# Patient Record
Sex: MALE | Race: WHITE | NOT HISPANIC OR LATINO
[De-identification: names, ages, dates, MRNs, and addresses within clinical notes are randomized per-mention and may not be internally consistent; named-entity substitution may affect disease eponyms.]

---

## 2017-01-31 ENCOUNTER — APPOINTMENT (OUTPATIENT)
Dept: CARDIOLOGY | Facility: CLINIC | Age: 57
End: 2017-01-31

## 2017-01-31 VITALS — BODY MASS INDEX: 35 KG/M2 | HEIGHT: 71 IN | WEIGHT: 250 LBS

## 2017-01-31 VITALS — DIASTOLIC BLOOD PRESSURE: 90 MMHG | HEART RATE: 88 BPM | SYSTOLIC BLOOD PRESSURE: 140 MMHG

## 2017-02-22 ENCOUNTER — APPOINTMENT (OUTPATIENT)
Dept: CARDIOLOGY | Facility: CLINIC | Age: 57
End: 2017-02-22

## 2017-02-22 VITALS — SYSTOLIC BLOOD PRESSURE: 128 MMHG | HEART RATE: 74 BPM | DIASTOLIC BLOOD PRESSURE: 78 MMHG

## 2017-02-22 VITALS — BODY MASS INDEX: 34.58 KG/M2 | WEIGHT: 247 LBS | HEIGHT: 71 IN

## 2017-04-18 ENCOUNTER — RX RENEWAL (OUTPATIENT)
Age: 57
End: 2017-04-18

## 2017-06-02 ENCOUNTER — OUTPATIENT (OUTPATIENT)
Dept: OUTPATIENT SERVICES | Facility: HOSPITAL | Age: 57
LOS: 1 days | Discharge: HOME | End: 2017-06-02

## 2017-06-02 DIAGNOSIS — R07.9 CHEST PAIN, UNSPECIFIED: ICD-10-CM

## 2017-06-08 ENCOUNTER — APPOINTMENT (OUTPATIENT)
Dept: CARDIOLOGY | Facility: CLINIC | Age: 57
End: 2017-06-08

## 2017-06-08 VITALS — WEIGHT: 248 LBS | BODY MASS INDEX: 34.72 KG/M2 | HEIGHT: 71 IN

## 2017-06-08 VITALS — DIASTOLIC BLOOD PRESSURE: 80 MMHG | HEART RATE: 72 BPM | SYSTOLIC BLOOD PRESSURE: 122 MMHG

## 2017-06-28 DIAGNOSIS — I10 ESSENTIAL (PRIMARY) HYPERTENSION: ICD-10-CM

## 2017-06-28 DIAGNOSIS — E78.5 HYPERLIPIDEMIA, UNSPECIFIED: ICD-10-CM

## 2017-08-17 ENCOUNTER — MEDICATION RENEWAL (OUTPATIENT)
Age: 57
End: 2017-08-17

## 2017-09-27 ENCOUNTER — TRANSCRIPTION ENCOUNTER (OUTPATIENT)
Age: 57
End: 2017-09-27

## 2017-10-03 ENCOUNTER — MEDICATION RENEWAL (OUTPATIENT)
Age: 57
End: 2017-10-03

## 2017-10-16 ENCOUNTER — OUTPATIENT (OUTPATIENT)
Dept: OUTPATIENT SERVICES | Facility: HOSPITAL | Age: 57
LOS: 1 days | Discharge: HOME | End: 2017-10-16

## 2017-10-16 DIAGNOSIS — E78.5 HYPERLIPIDEMIA, UNSPECIFIED: ICD-10-CM

## 2017-10-16 DIAGNOSIS — I25.10 ATHEROSCLEROTIC HEART DISEASE OF NATIVE CORONARY ARTERY WITHOUT ANGINA PECTORIS: ICD-10-CM

## 2017-10-16 DIAGNOSIS — I10 ESSENTIAL (PRIMARY) HYPERTENSION: ICD-10-CM

## 2017-10-16 DIAGNOSIS — R07.9 CHEST PAIN, UNSPECIFIED: ICD-10-CM

## 2017-10-16 DIAGNOSIS — R73.03 PREDIABETES: ICD-10-CM

## 2017-10-16 DIAGNOSIS — K21.0 GASTRO-ESOPHAGEAL REFLUX DISEASE WITH ESOPHAGITIS: ICD-10-CM

## 2017-10-16 DIAGNOSIS — Z00.00 ENCOUNTER FOR GENERAL ADULT MEDICAL EXAMINATION WITHOUT ABNORMAL FINDINGS: ICD-10-CM

## 2017-10-20 ENCOUNTER — APPOINTMENT (OUTPATIENT)
Dept: CARDIOLOGY | Facility: CLINIC | Age: 57
End: 2017-10-20

## 2017-10-20 VITALS — DIASTOLIC BLOOD PRESSURE: 82 MMHG | HEART RATE: 74 BPM | SYSTOLIC BLOOD PRESSURE: 120 MMHG

## 2017-10-20 VITALS — BODY MASS INDEX: 35.28 KG/M2 | HEIGHT: 71 IN | WEIGHT: 252 LBS

## 2018-02-21 ENCOUNTER — RX RENEWAL (OUTPATIENT)
Age: 58
End: 2018-02-21

## 2018-02-26 ENCOUNTER — LABORATORY RESULT (OUTPATIENT)
Age: 58
End: 2018-02-26

## 2018-02-26 ENCOUNTER — OUTPATIENT (OUTPATIENT)
Dept: OUTPATIENT SERVICES | Facility: HOSPITAL | Age: 58
LOS: 1 days | Discharge: HOME | End: 2018-02-26

## 2018-02-26 DIAGNOSIS — Z00.00 ENCOUNTER FOR GENERAL ADULT MEDICAL EXAMINATION WITHOUT ABNORMAL FINDINGS: ICD-10-CM

## 2018-02-26 DIAGNOSIS — R73.03 PREDIABETES: ICD-10-CM

## 2018-02-26 DIAGNOSIS — E78.5 HYPERLIPIDEMIA, UNSPECIFIED: ICD-10-CM

## 2018-02-26 DIAGNOSIS — I25.10 ATHEROSCLEROTIC HEART DISEASE OF NATIVE CORONARY ARTERY WITHOUT ANGINA PECTORIS: ICD-10-CM

## 2018-02-26 DIAGNOSIS — I10 ESSENTIAL (PRIMARY) HYPERTENSION: ICD-10-CM

## 2018-02-26 DIAGNOSIS — K21.0 GASTRO-ESOPHAGEAL REFLUX DISEASE WITH ESOPHAGITIS: ICD-10-CM

## 2018-03-02 ENCOUNTER — APPOINTMENT (OUTPATIENT)
Dept: CARDIOLOGY | Facility: CLINIC | Age: 58
End: 2018-03-02

## 2018-03-02 VITALS — SYSTOLIC BLOOD PRESSURE: 120 MMHG | DIASTOLIC BLOOD PRESSURE: 70 MMHG

## 2018-03-02 VITALS — WEIGHT: 253 LBS | HEIGHT: 71 IN | BODY MASS INDEX: 35.42 KG/M2

## 2018-03-06 ENCOUNTER — OUTPATIENT (OUTPATIENT)
Dept: OUTPATIENT SERVICES | Facility: HOSPITAL | Age: 58
LOS: 1 days | Discharge: HOME | End: 2018-03-06

## 2018-03-06 DIAGNOSIS — E78.5 HYPERLIPIDEMIA, UNSPECIFIED: ICD-10-CM

## 2018-03-06 DIAGNOSIS — K21.0 GASTRO-ESOPHAGEAL REFLUX DISEASE WITH ESOPHAGITIS: ICD-10-CM

## 2018-03-06 DIAGNOSIS — I25.10 ATHEROSCLEROTIC HEART DISEASE OF NATIVE CORONARY ARTERY WITHOUT ANGINA PECTORIS: ICD-10-CM

## 2018-03-06 DIAGNOSIS — I10 ESSENTIAL (PRIMARY) HYPERTENSION: ICD-10-CM

## 2018-03-06 DIAGNOSIS — I70.90 UNSPECIFIED ATHEROSCLEROSIS: ICD-10-CM

## 2018-03-11 LAB
POTASSIUM SERPL-SCNC: 3.2 MMOL/L
POTASSIUM SERPL-SCNC: 4.9 MMOL/L

## 2018-05-16 ENCOUNTER — MEDICATION RENEWAL (OUTPATIENT)
Age: 58
End: 2018-05-16

## 2018-05-27 ENCOUNTER — RX RENEWAL (OUTPATIENT)
Age: 58
End: 2018-05-27

## 2018-06-05 ENCOUNTER — APPOINTMENT (OUTPATIENT)
Dept: CARDIOLOGY | Facility: CLINIC | Age: 58
End: 2018-06-05

## 2018-06-08 ENCOUNTER — OUTPATIENT (OUTPATIENT)
Dept: OUTPATIENT SERVICES | Facility: HOSPITAL | Age: 58
LOS: 1 days | Discharge: HOME | End: 2018-06-08

## 2018-06-08 DIAGNOSIS — I10 ESSENTIAL (PRIMARY) HYPERTENSION: ICD-10-CM

## 2018-06-08 DIAGNOSIS — K21.0 GASTRO-ESOPHAGEAL REFLUX DISEASE WITH ESOPHAGITIS: ICD-10-CM

## 2018-06-08 DIAGNOSIS — R73.03 PREDIABETES: ICD-10-CM

## 2018-06-08 DIAGNOSIS — I70.90 UNSPECIFIED ATHEROSCLEROSIS: ICD-10-CM

## 2018-06-08 DIAGNOSIS — I25.10 ATHEROSCLEROTIC HEART DISEASE OF NATIVE CORONARY ARTERY WITHOUT ANGINA PECTORIS: ICD-10-CM

## 2018-06-09 LAB
ALBUMIN SERPL ELPH-MCNC: 4.4 G/DL
ALP BLD-CCNC: 61 U/L
ALT SERPL-CCNC: 24 U/L
ANION GAP SERPL CALC-SCNC: 16 MMOL/L
AST SERPL-CCNC: 24 U/L
BASOPHILS # BLD AUTO: 0 K/UL
BASOPHILS NFR BLD AUTO: 0 %
BILIRUB SERPL-MCNC: 1 MG/DL
BUN SERPL-MCNC: 19 MG/DL
CALCIUM SERPL-MCNC: 9.1 MG/DL
CHLORIDE SERPL-SCNC: 106 MMOL/L
CHOLEST SERPL-MCNC: 121 MG/DL
CHOLEST/HDLC SERPL: 3 RATIO
CO2 SERPL-SCNC: 25 MMOL/L
CREAT SERPL-MCNC: 1 MG/DL
EOSINOPHIL # BLD AUTO: 0.11 K/UL
EOSINOPHIL NFR BLD AUTO: 2.6 %
GLUCOSE SERPL-MCNC: 65 MG/DL
HCT VFR BLD CALC: 46.3 %
HDLC SERPL-MCNC: 40 MG/DL
HGB BLD-MCNC: 14.6 G/DL
IMM GRANULOCYTES NFR BLD AUTO: 0 %
LDLC SERPL CALC-MCNC: 70 MG/DL
LYMPHOCYTES # BLD AUTO: 1.77 K/UL
LYMPHOCYTES NFR BLD AUTO: 42.1 %
MAN DIFF?: NORMAL
MCHC RBC-ENTMCNC: 30 PG
MCHC RBC-ENTMCNC: 31.5 GM/DL
MCV RBC AUTO: 95.1 FL
MONOCYTES # BLD AUTO: 0.31 K/UL
MONOCYTES NFR BLD AUTO: 7.4 %
NEUTROPHILS # BLD AUTO: 2.01 K/UL
NEUTROPHILS NFR BLD AUTO: 47.9 %
PLATELET # BLD AUTO: 158 K/UL
POTASSIUM SERPL-SCNC: 4.5 MMOL/L
PROT SERPL-MCNC: 6.6 G/DL
RBC # BLD: 4.87 M/UL
RBC # FLD: 14.2 %
SODIUM SERPL-SCNC: 147 MMOL/L
TRIGL SERPL-MCNC: 97 MG/DL
WBC # FLD AUTO: 4.2 K/UL

## 2018-06-13 ENCOUNTER — APPOINTMENT (OUTPATIENT)
Dept: CARDIOLOGY | Facility: CLINIC | Age: 58
End: 2018-06-13

## 2018-06-13 VITALS
SYSTOLIC BLOOD PRESSURE: 122 MMHG | HEART RATE: 68 BPM | DIASTOLIC BLOOD PRESSURE: 78 MMHG | BODY MASS INDEX: 34.44 KG/M2 | WEIGHT: 246 LBS | HEIGHT: 71 IN

## 2018-08-22 ENCOUNTER — MEDICATION RENEWAL (OUTPATIENT)
Age: 58
End: 2018-08-22

## 2018-08-23 ENCOUNTER — MEDICATION RENEWAL (OUTPATIENT)
Age: 58
End: 2018-08-23

## 2018-09-25 ENCOUNTER — MEDICATION RENEWAL (OUTPATIENT)
Age: 58
End: 2018-09-25

## 2018-11-02 ENCOUNTER — APPOINTMENT (OUTPATIENT)
Dept: CARDIOLOGY | Facility: CLINIC | Age: 58
End: 2018-11-02

## 2018-11-12 ENCOUNTER — OUTPATIENT (OUTPATIENT)
Dept: OUTPATIENT SERVICES | Facility: HOSPITAL | Age: 58
LOS: 1 days | Discharge: HOME | End: 2018-11-12

## 2018-11-12 DIAGNOSIS — K21.0 GASTRO-ESOPHAGEAL REFLUX DISEASE WITH ESOPHAGITIS: ICD-10-CM

## 2018-11-12 DIAGNOSIS — R73.03 PREDIABETES: ICD-10-CM

## 2018-11-12 DIAGNOSIS — E78.5 HYPERLIPIDEMIA, UNSPECIFIED: ICD-10-CM

## 2018-11-12 DIAGNOSIS — I25.10 ATHEROSCLEROTIC HEART DISEASE OF NATIVE CORONARY ARTERY WITHOUT ANGINA PECTORIS: ICD-10-CM

## 2018-11-12 DIAGNOSIS — I10 ESSENTIAL (PRIMARY) HYPERTENSION: ICD-10-CM

## 2018-11-12 DIAGNOSIS — Z00.00 ENCOUNTER FOR GENERAL ADULT MEDICAL EXAMINATION WITHOUT ABNORMAL FINDINGS: ICD-10-CM

## 2018-11-12 DIAGNOSIS — I70.90 UNSPECIFIED ATHEROSCLEROSIS: ICD-10-CM

## 2018-11-17 ENCOUNTER — APPOINTMENT (OUTPATIENT)
Dept: CARDIOLOGY | Facility: CLINIC | Age: 58
End: 2018-11-17

## 2018-11-17 VITALS
HEART RATE: 62 BPM | HEIGHT: 71 IN | DIASTOLIC BLOOD PRESSURE: 70 MMHG | SYSTOLIC BLOOD PRESSURE: 108 MMHG | BODY MASS INDEX: 35.42 KG/M2 | WEIGHT: 253 LBS

## 2018-11-17 NOTE — PHYSICAL EXAM
[General Appearance - Well Developed] : well developed [Normal Appearance] : normal appearance [Well Groomed] : well groomed [General Appearance - Well Nourished] : well nourished [No Deformities] : no deformities [General Appearance - In No Acute Distress] : no acute distress [Normal Conjunctiva] : the conjunctiva exhibited no abnormalities [Eyelids - No Xanthelasma] : the eyelids demonstrated no xanthelasmas [Normal Oral Mucosa] : normal oral mucosa [No Oral Pallor] : no oral pallor [No Oral Cyanosis] : no oral cyanosis [Respiration, Rhythm And Depth] : normal respiratory rhythm and effort [Exaggerated Use Of Accessory Muscles For Inspiration] : no accessory muscle use [Auscultation Breath Sounds / Voice Sounds] : lungs were clear to auscultation bilaterally [Abdomen Soft] : soft [Abdomen Tenderness] : non-tender [Abdomen Mass (___ Cm)] : no abdominal mass palpated [Abnormal Walk] : normal gait [Gait - Sufficient For Exercise Testing] : the gait was sufficient for exercise testing [Nail Clubbing] : no clubbing of the fingernails [Cyanosis, Localized] : no localized cyanosis [Petechial Hemorrhages (___cm)] : no petechial hemorrhages [Skin Color & Pigmentation] : normal skin color and pigmentation [] : no rash [No Venous Stasis] : no venous stasis [Skin Lesions] : no skin lesions [No Skin Ulcers] : no skin ulcer [No Xanthoma] : no  xanthoma was observed [Oriented To Time, Place, And Person] : oriented to person, place, and time [Affect] : the affect was normal [Mood] : the mood was normal [No Anxiety] : not feeling anxious [Normal Rate] : normal [Rhythm Regular] : regular [1+] : left 1+ [FreeTextEntry1] : No JVD  [Rt] : no varicose veins of the right leg [Lt] : no varicose veins of the left leg

## 2018-11-17 NOTE — HISTORY OF PRESENT ILLNESS
[FreeTextEntry1] : The patient has had a cath Stents in his LCX and RCA are patent. Lesion in the mid LAD is not signficant by FFR. The patient did have a severe lesion very distal in the LAD apical segment which was small in calber <2 mm.\par The patient has had atypical symptoms. He had an ETT echo . He had completed 9 minutes and 32 seconds No ischemia or symptoms . He has GERD as well and is under the care of GI . . Sympotms remain unchanged. He had stopped his ranexa on his own . He has no chest pain c/w his previous heart idscomfort.

## 2018-11-18 LAB
ALBUMIN SERPL ELPH-MCNC: 4.3 G/DL
ALP BLD-CCNC: 66 U/L
ALT SERPL-CCNC: 18 U/L
ANION GAP SERPL CALC-SCNC: 13 MMOL/L
AST SERPL-CCNC: 16 U/L
BASOPHILS # BLD AUTO: 0.02 K/UL
BASOPHILS NFR BLD AUTO: 0.3 %
BILIRUB SERPL-MCNC: 0.6 MG/DL
BUN SERPL-MCNC: 18 MG/DL
CALCIUM SERPL-MCNC: 8.7 MG/DL
CHLORIDE SERPL-SCNC: 106 MMOL/L
CHOLEST SERPL-MCNC: 119 MG/DL
CHOLEST/HDLC SERPL: 2.5 RATIO
CO2 SERPL-SCNC: 25 MMOL/L
CREAT SERPL-MCNC: 1 MG/DL
EOSINOPHIL # BLD AUTO: 0.08 K/UL
EOSINOPHIL NFR BLD AUTO: 1.1 %
ESTIMATED AVERAGE GLUCOSE: 108 MG/DL
GLUCOSE SERPL-MCNC: 114 MG/DL
HBA1C MFR BLD HPLC: 5.4 %
HCT VFR BLD CALC: 47.6 %
HDLC SERPL-MCNC: 48 MG/DL
HGB BLD-MCNC: 15.3 G/DL
IMM GRANULOCYTES NFR BLD AUTO: 0.3 %
LDLC SERPL CALC-MCNC: 67 MG/DL
LYMPHOCYTES # BLD AUTO: 1.51 K/UL
LYMPHOCYTES NFR BLD AUTO: 21.5 %
MAN DIFF?: NORMAL
MCHC RBC-ENTMCNC: 30 PG
MCHC RBC-ENTMCNC: 32.1 G/DL
MCV RBC AUTO: 93.3 FL
MONOCYTES # BLD AUTO: 0.42 K/UL
MONOCYTES NFR BLD AUTO: 6 %
NEUTROPHILS # BLD AUTO: 4.98 K/UL
NEUTROPHILS NFR BLD AUTO: 70.8 %
PLATELET # BLD AUTO: 172 K/UL
POTASSIUM SERPL-SCNC: 4.5 MMOL/L
PROT SERPL-MCNC: 6.5 G/DL
RBC # BLD: 5.1 M/UL
RBC # FLD: 13.5 %
SODIUM SERPL-SCNC: 144 MMOL/L
TRIGL SERPL-MCNC: 73 MG/DL
WBC # FLD AUTO: 7.03 K/UL

## 2018-12-18 ENCOUNTER — APPOINTMENT (OUTPATIENT)
Dept: PLASTIC SURGERY | Facility: CLINIC | Age: 58
End: 2018-12-18
Payer: COMMERCIAL

## 2018-12-18 VITALS — BODY MASS INDEX: 33.18 KG/M2 | WEIGHT: 245 LBS | HEIGHT: 72 IN

## 2018-12-18 PROCEDURE — 99203 OFFICE O/P NEW LOW 30 MIN: CPT

## 2019-02-15 ENCOUNTER — OUTPATIENT (OUTPATIENT)
Dept: OUTPATIENT SERVICES | Facility: HOSPITAL | Age: 59
LOS: 1 days | Discharge: HOME | End: 2019-02-15

## 2019-02-15 ENCOUNTER — LABORATORY RESULT (OUTPATIENT)
Age: 59
End: 2019-02-15

## 2019-02-15 ENCOUNTER — APPOINTMENT (OUTPATIENT)
Dept: PLASTIC SURGERY | Facility: CLINIC | Age: 59
End: 2019-02-15
Payer: COMMERCIAL

## 2019-02-15 PROCEDURE — 13132 CMPLX RPR F/C/C/M/N/AX/G/H/F: CPT | Mod: 59

## 2019-02-15 PROCEDURE — 17110 DESTRUCTION B9 LES UP TO 14: CPT | Mod: 59

## 2019-02-15 PROCEDURE — 11423 EXC H-F-NK-SP B9+MARG 2.1-3: CPT | Mod: 59

## 2019-02-15 PROCEDURE — 11422 EXC H-F-NK-SP B9+MARG 1.1-2: CPT

## 2019-02-15 NOTE — PROCEDURE
[FreeTextEntry6] : Patient is a 58 year old male with  2 lesions and multiple skin tags:\par \par 1.  right neck lesion measuring approximately 2 x 1.5 cm \par 2.  anterior neck lesion measuring approximately 1.5 x 1 cm\par 3.  right axilla, left neck, left lateral canthus, left thigh skin tags measuring approximately 5 mm \par \par The areas were prepped and draped in the usual fashion.  Local anesthetic was administered to each site using 1% lidocaine with epinephrine.\par \par Lesions were sharply excised from the right neck, anterior neck and right axilla, left neck, left lateral canthus, left thigh skin tags .  All areas were irrigated copiously.  Complex wound closure was performed in layers for the right and anterior neck. Hemostasis using monsels solution for the skin tags.\par \par 1.  right neck wound measuring approximately 2 cm \par 2.  anterior neck wound measuring approximately 1.5 cm\par 3.   right axilla, left neck, left lateral canthus, left thigh skin tags wound measuring approximately  5 mm \par \par \par Sterile dressing applied to each site.  \par \par Patient tolerated procedure well and understands post-op instructions.\par \par Sutures Used: 4-0 nylon, monsels\par \par \par \par

## 2019-02-18 DIAGNOSIS — D48.5 NEOPLASM OF UNCERTAIN BEHAVIOR OF SKIN: ICD-10-CM

## 2019-02-25 ENCOUNTER — APPOINTMENT (OUTPATIENT)
Dept: PLASTIC SURGERY | Facility: CLINIC | Age: 59
End: 2019-02-25
Payer: COMMERCIAL

## 2019-02-25 PROCEDURE — 99024 POSTOP FOLLOW-UP VISIT: CPT

## 2019-02-25 NOTE — ASSESSMENT
[FreeTextEntry1] : 57 yo M POD#10 s/p excision of 2 right neck lesions and multiple skin tags. Pathology reveals seborrheic keratosis, polypoid intradermal melanocytic nevi and skin tags, all benign. Doing well. \par \par - right neck sutures removed, steri strips applied\par - daily Aquaphor to all sites \par - scar massage \par - may use Scarguard in 1 week\par - pathology discussed\par - f/u PRN

## 2019-02-25 NOTE — PHYSICAL EXAM
[de-identified] : well-appearing male, NAD [de-identified] : right neck incisions clean and dry, no evidence of cellulitis or fluid collection, slight dog ear deformity, left neck shave site c/d/i [de-identified] : right axilla, left neck, right groin and left lateral canthal shave sites healing appropriately, c/d/i, no evidence of cellulitis

## 2019-02-25 NOTE — HISTORY OF PRESENT ILLNESS
[FreeTextEntry1] :  59 y/o M with PMH od CAB s/p PCI x4 on ASA/Plavix who presents for evaluation of right neck growth x10 years, getting progressively larger. C/o "throbbing" pain and skin sloughing. Denies trauma or bleeding/drainage. Denies personal or family hx of Skin CA. 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 Does not see a dermatologist. \par \par Interval history (2/25/19). Patient presents today POD#10 s/p excision of 2 right neck lesions and multiple skin tags. He is doing well and denies any fever, chills or bleeding.

## 2019-02-25 NOTE — DATA REVIEWED
[FreeTextEntry1] :  Biopsy             Final\par \par \par   STEFANIE POTTS                        \par \par                 Surgical Final Report\par \par \par         Final Diagnosis\par           1. Left neck, pigmented lesion, excision:\par            - Seborrheic keratosis.\par \par           2. Anterior neck, pigmented lesion, excision:\par            - Seborrheic keratosis.\par \par           3. Skin tags, left neck, right axilla x 2, right groin and left\par           lateral canthus, excision:\par            - Two polypoid intradermal melanocytic nevi, skin tags, and\par           polypoid seborrheic keratosis.\par \par           Verified by: Marina Avalos M.D.\par           (Electronic Signature)\par           Reported on: 02/21/19 08:36 EST, [facility],\[place]           [place] [address]\par           _________________________________________________________________\par \par           Clinical History\par           Excision of left neck pigmented lesion, excision of anterior neck\par           pigmented lesion, excision of left neck, right axilla x2, right\par           groin, left lateral canthus skin tags\par \par           Specimen(s) Submitted\par           1     Left neck pigmented lesion\par           2     Anterior neck pigmented lesion\par           3     Left neck, Right axilla, Right groin, Left lateral canthus\par           skin tags\par \par           Gross Description\par           1. The specimen is received in formalin, labeled "left neck\par           pigmented lesion" and consists of an unoriented ellipse of tan\par           skin measuring 1.5 x 0.7 x 0.3 cm On the skin surface there is\par           one raised grey lesion measuring 0.9 x 0.7 cm, < 0.1 cm from the\par           nearest margin. The base of the specimen is inked black. The\par           specimen is submitted entirely.\par \par           Summary of Sections:\par \par           1A - Tips, shaved - 1\par           1B - Remaining serially sectioned  specimen - 1\par \par           Total blocks: 2\par \par           2. The specimen is received in formalin, and labeled "anterior\par           neck pigmented lesion" and consists of an unoriented ellipse of\par           tan skin measuring 0.7 x 0.3 x 0.3 cm. On the skin surface, there\par           is a raised grey lesion measuring 0.4 x 0.3\par \par \par \par \par \par           STEFANIE POTTS                        2\par \par \par \par                  Surgical Final Report\par \par \par \par \par           cm. It is < 0.1 cm from the nearest margin. The base of the\par           specimen is inked black, the specimen is trisected. The specimen\par           is submitted entirely. (1 block)\par \par           3. The specimen is received in formalin, and labeled "left\par           lateral canthus skin tag" and consists of six polypoid fragments\par           of tan skin, measuring 0.2 x 0.1 x 0.1 cm through 1 x 0.7 x 0.5\par           cm. The bases of the two largest fragments are inked black, both\par           are bisected. The specimen is submitted entirely. (2 blocks)\par \par           Specimen was received and underwent gross examination at [facility], [address] [place] [address],\[place] [address].\par \par           02/18/19 13:54 ns\par \par           Perioperative Diagnosis\par           D48.5 - Neoplasm of uncertain behavior of skin\par \par  \par \par  Ordered by: ROSEY CHINO IV       Collected/Examined: [id number] 12:36PM       \par Verification Required       Stage: Final       \par  Performed at: [facility] (Med Director: Wilner Clay M.D)       Resulted: [id number] 08:36AM       Last Updated: 21Feb2019 08:36AM       Accession: [id number]

## 2019-02-28 ENCOUNTER — MEDICATION RENEWAL (OUTPATIENT)
Age: 59
End: 2019-02-28

## 2019-03-03 ENCOUNTER — RX RENEWAL (OUTPATIENT)
Age: 59
End: 2019-03-03

## 2019-03-18 ENCOUNTER — LABORATORY RESULT (OUTPATIENT)
Age: 59
End: 2019-03-18

## 2019-03-18 ENCOUNTER — OUTPATIENT (OUTPATIENT)
Dept: OUTPATIENT SERVICES | Facility: HOSPITAL | Age: 59
LOS: 1 days | Discharge: HOME | End: 2019-03-18

## 2019-03-18 DIAGNOSIS — E78.5 HYPERLIPIDEMIA, UNSPECIFIED: ICD-10-CM

## 2019-03-18 DIAGNOSIS — I25.10 ATHEROSCLEROTIC HEART DISEASE OF NATIVE CORONARY ARTERY WITHOUT ANGINA PECTORIS: ICD-10-CM

## 2019-03-18 DIAGNOSIS — I10 ESSENTIAL (PRIMARY) HYPERTENSION: ICD-10-CM

## 2019-03-18 DIAGNOSIS — R73.03 PREDIABETES: ICD-10-CM

## 2019-03-18 DIAGNOSIS — K21.0 GASTRO-ESOPHAGEAL REFLUX DISEASE WITH ESOPHAGITIS: ICD-10-CM

## 2019-03-24 LAB
ALBUMIN SERPL ELPH-MCNC: 4.2 G/DL
ALP BLD-CCNC: 53 U/L
ALT SERPL-CCNC: 29 U/L
ANION GAP SERPL CALC-SCNC: 7 MMOL/L
AST SERPL-CCNC: 27 U/L
BILIRUB SERPL-MCNC: 0.8 MG/DL
BUN SERPL-MCNC: 16 MG/DL
CALCIUM SERPL-MCNC: 8.7 MG/DL
CHLORIDE SERPL-SCNC: 106 MMOL/L
CHOLEST SERPL-MCNC: 123 MG/DL
CHOLEST/HDLC SERPL: 3 RATIO
CO2 SERPL-SCNC: 30 MMOL/L
CREAT SERPL-MCNC: 1.2 MG/DL
ESTIMATED AVERAGE GLUCOSE: 97 MG/DL
GLUCOSE SERPL-MCNC: 101 MG/DL
HBA1C MFR BLD HPLC: 5 %
HDLC SERPL-MCNC: 41 MG/DL
LDLC SERPL CALC-MCNC: 75 MG/DL
POTASSIUM SERPL-SCNC: 4.9 MMOL/L
PROT SERPL-MCNC: 6.2 G/DL
SODIUM SERPL-SCNC: 143 MMOL/L
TRIGL SERPL-MCNC: 64 MG/DL

## 2019-03-25 ENCOUNTER — APPOINTMENT (OUTPATIENT)
Dept: CARDIOLOGY | Facility: CLINIC | Age: 59
End: 2019-03-25

## 2019-03-25 VITALS
WEIGHT: 260 LBS | BODY MASS INDEX: 35.21 KG/M2 | HEART RATE: 69 BPM | HEIGHT: 72 IN | SYSTOLIC BLOOD PRESSURE: 160 MMHG | DIASTOLIC BLOOD PRESSURE: 90 MMHG

## 2019-03-25 NOTE — HISTORY OF PRESENT ILLNESS
[FreeTextEntry1] : The patient has been unchanged. Symptoms seems to be unchanged. A lot of his sympotms come on with stress and aggravation. Hx of PCI and stents i LCX and RCA . LAD has distal disease . He has no symptoms similar to his pre stent symptoms. He has gained weight . ETT echo in 6-18 was negative for ischemia  .

## 2019-03-25 NOTE — ASSESSMENT
[FreeTextEntry1] : The patient has distal LAD disease which is significant but the segment is too small for intervention. RCA and LCX stents are patent. Mid LAD lesion is not significant by FFR . Has chest pain only when upset. No ECG changes. The patient has had aggressive CAD  and has an area of the distal LAD which is not amenable to intervention . ETT Echo was negative for ischemia at high exercise load in 6-18 . No chest with over 9 minutes of exercise. His symptoms are not similar to the symptoms he had prior to the interventions. He is back on Ranexa There was no change in symptoms. He does not have symptoms simlar to his MI sysmptoms or symptoms prior to intervetnion . He has gained a significant amount of weigh t. He has not been waling .

## 2019-05-22 ENCOUNTER — MEDICATION RENEWAL (OUTPATIENT)
Age: 59
End: 2019-05-22

## 2019-05-23 ENCOUNTER — RX RENEWAL (OUTPATIENT)
Age: 59
End: 2019-05-23

## 2019-06-27 ENCOUNTER — RX RENEWAL (OUTPATIENT)
Age: 59
End: 2019-06-27

## 2019-06-30 ENCOUNTER — RX RENEWAL (OUTPATIENT)
Age: 59
End: 2019-06-30

## 2019-07-12 ENCOUNTER — TRANSCRIPTION ENCOUNTER (OUTPATIENT)
Age: 59
End: 2019-07-12

## 2019-07-12 ENCOUNTER — OTHER (OUTPATIENT)
Age: 59
End: 2019-07-12

## 2019-07-26 ENCOUNTER — OUTPATIENT (OUTPATIENT)
Dept: OUTPATIENT SERVICES | Facility: HOSPITAL | Age: 59
LOS: 1 days | Discharge: HOME | End: 2019-07-26

## 2019-07-26 DIAGNOSIS — E78.5 HYPERLIPIDEMIA, UNSPECIFIED: ICD-10-CM

## 2019-07-26 DIAGNOSIS — I25.10 ATHEROSCLEROTIC HEART DISEASE OF NATIVE CORONARY ARTERY WITHOUT ANGINA PECTORIS: ICD-10-CM

## 2019-07-26 DIAGNOSIS — I10 ESSENTIAL (PRIMARY) HYPERTENSION: ICD-10-CM

## 2019-07-26 DIAGNOSIS — K21.0 GASTRO-ESOPHAGEAL REFLUX DISEASE WITH ESOPHAGITIS: ICD-10-CM

## 2019-07-26 DIAGNOSIS — R73.03 PREDIABETES: ICD-10-CM

## 2019-08-02 ENCOUNTER — APPOINTMENT (OUTPATIENT)
Dept: CARDIOLOGY | Facility: CLINIC | Age: 59
End: 2019-08-02
Payer: COMMERCIAL

## 2019-08-02 VITALS
HEIGHT: 72 IN | BODY MASS INDEX: 34.54 KG/M2 | SYSTOLIC BLOOD PRESSURE: 130 MMHG | HEART RATE: 67 BPM | WEIGHT: 255 LBS | DIASTOLIC BLOOD PRESSURE: 86 MMHG

## 2019-08-02 DIAGNOSIS — I70.90 UNSPECIFIED ATHEROSCLEROSIS: ICD-10-CM

## 2019-08-02 PROCEDURE — 93000 ELECTROCARDIOGRAM COMPLETE: CPT

## 2019-08-02 PROCEDURE — 99214 OFFICE O/P EST MOD 30 MIN: CPT

## 2019-08-02 NOTE — DATA REVIEWED
[FreeTextEntry1] :  Biopsy             Final\par \par \par   STEFANIE POTTS                        \par \par                 Surgical Final Report\par \par \par         Final Diagnosis\par           1. Left neck, pigmented lesion, excision:\par            - Seborrheic keratosis.\par \par           2. Anterior neck, pigmented lesion, excision:\par            - Seborrheic keratosis.\par \par           3. Skin tags, left neck, right axilla x 2, right groin and left\par           lateral canthus, excision:\par            - Two polypoid intradermal melanocytic nevi, skin tags, and\par           polypoid seborrheic keratosis.\par \par           Verified by: Marina Avalos M.D.\par           (Electronic Signature)\par           Reported on: 02/21/19 08:36 EST, [facility],\[place]           [place] [address]\par           _________________________________________________________________\par \par           Clinical History\par           Excision of left neck pigmented lesion, excision of anterior neck\par           pigmented lesion, excision of left neck, right axilla x2, right\par           groin, left lateral canthus skin tags\par \par           Specimen(s) Submitted\par           1     Left neck pigmented lesion\par           2     Anterior neck pigmented lesion\par           3     Left neck, Right axilla, Right groin, Left lateral canthus\par           skin tags\par \par           Gross Description\par           1. The specimen is received in formalin, labeled "left neck\par           pigmented lesion" and consists of an unoriented ellipse of tan\par           skin measuring 1.5 x 0.7 x 0.3 cm On the skin surface there is\par           one raised grey lesion measuring 0.9 x 0.7 cm, < 0.1 cm from the\par           nearest margin. The base of the specimen is inked black. The\par           specimen is submitted entirely.\par \par           Summary of Sections:\par \par           1A - Tips, shaved - 1\par           1B - Remaining serially sectioned  specimen - 1\par \par           Total blocks: 2\par \par           2. The specimen is received in formalin, and labeled "anterior\par           neck pigmented lesion" and consists of an unoriented ellipse of\par           tan skin measuring 0.7 x 0.3 x 0.3 cm. On the skin surface, there\par           is a raised grey lesion measuring 0.4 x 0.3\par \par \par \par \par \par           STEFANIE POTTS                        2\par \par \par \par                  Surgical Final Report\par \par \par \par \par           cm. It is < 0.1 cm from the nearest margin. The base of the\par           specimen is inked black, the specimen is trisected. The specimen\par           is submitted entirely. (1 block)\par \par           3. The specimen is received in formalin, and labeled "left\par           lateral canthus skin tag" and consists of six polypoid fragments\par           of tan skin, measuring 0.2 x 0.1 x 0.1 cm through 1 x 0.7 x 0.5\par           cm. The bases of the two largest fragments are inked black, both\par           are bisected. The specimen is submitted entirely. (2 blocks)\par \par           Specimen was received and underwent gross examination at [facility], [address] [place] [address],\[place] [address].\par \par           02/18/19 13:54 ns\par \par           Perioperative Diagnosis\par           D48.5 - Neoplasm of uncertain behavior of skin\par \par  \par \par  Ordered by: ROSEY CHINO IV       Collected/Examined: [id number] 12:36PM       \par Verification Required       Stage: Final       \par  Performed at: [facility] (Med Director: Wilner Clay M.D)       Resulted: [id number] 08:36AM       Last Updated: 21Feb2019 08:36AM       Accession: [id number]

## 2019-08-02 NOTE — REASON FOR VISIT
[Follow-Up - Clinic] : a clinic follow-up of [Coronary Artery Disease] : coronary artery disease [Hyperlipidemia] : hyperlipidemia [Hypertension] : hypertension [Post Op: _________] : a [unfilled] post op visit

## 2019-08-02 NOTE — PHYSICAL EXAM
[General Appearance - Well Developed] : well developed [Normal Appearance] : normal appearance [Well Groomed] : well groomed [General Appearance - Well Nourished] : well nourished [No Deformities] : no deformities [General Appearance - In No Acute Distress] : no acute distress [Normal Conjunctiva] : the conjunctiva exhibited no abnormalities [Eyelids - No Xanthelasma] : the eyelids demonstrated no xanthelasmas [Normal Oral Mucosa] : normal oral mucosa [No Oral Pallor] : no oral pallor [No Oral Cyanosis] : no oral cyanosis [FreeTextEntry1] : No JVD  [Respiration, Rhythm And Depth] : normal respiratory rhythm and effort [Auscultation Breath Sounds / Voice Sounds] : lungs were clear to auscultation bilaterally [Exaggerated Use Of Accessory Muscles For Inspiration] : no accessory muscle use [Abdomen Soft] : soft [Abdomen Tenderness] : non-tender [Abnormal Walk] : normal gait [Abdomen Mass (___ Cm)] : no abdominal mass palpated [Gait - Sufficient For Exercise Testing] : the gait was sufficient for exercise testing [Nail Clubbing] : no clubbing of the fingernails [Petechial Hemorrhages (___cm)] : no petechial hemorrhages [Cyanosis, Localized] : no localized cyanosis [Skin Color & Pigmentation] : normal skin color and pigmentation [] : no rash [No Venous Stasis] : no venous stasis [Skin Lesions] : no skin lesions [No Skin Ulcers] : no skin ulcer [No Xanthoma] : no  xanthoma was observed [Oriented To Time, Place, And Person] : oriented to person, place, and time [Mood] : the mood was normal [Affect] : the affect was normal [No Anxiety] : not feeling anxious [de-identified] : well-appearing male, NAD [Normal Rate] : normal [de-identified] : right axilla, left neck, right groin and left lateral canthal shave sites healing appropriately, c/d/i, no evidence of cellulitis  [de-identified] : right neck incisions clean and dry, no evidence of cellulitis or fluid collection, slight dog ear deformity, left neck shave site c/d/i [Rhythm Regular] : regular [Rt] : no varicose veins of the right leg [1+] : left 1+ [Lt] : no varicose veins of the left leg

## 2019-08-02 NOTE — HISTORY OF PRESENT ILLNESS
[FreeTextEntry1] : The patient has been feeling well. His GERD symptoms have improved . No chest pain similar to his cardiac issues . The patient has had prior stents in his LCX and RCA . He has distal LAD disease . .

## 2019-08-02 NOTE — ASSESSMENT
[FreeTextEntry1] : The patient has distal LAD disease which is significant but the segment is too small for intervention. RCA and LCX stents are patent. Mid LAD lesion is not significant by FFR . Has chest pain only when upset. No ECG changes. The patient has had aggressive CAD  and has an area of the distal LAD which is not amenable to intervention . ETT Echo was negative for ischemia at high exercise load in 6-18 . No chest with over 9 minutes of exercise. His symptoms are not similar to the symptoms he had prior to the interventions. LDL is at goal He has lost some weight . He has had a torn mensicus in his right knee and he has been exercising on a stationary bike .

## 2019-08-04 LAB
ALBUMIN SERPL ELPH-MCNC: 4.5 G/DL
ALP BLD-CCNC: 53 U/L
ALT SERPL-CCNC: 20 U/L
ANION GAP SERPL CALC-SCNC: 13 MMOL/L
AST SERPL-CCNC: 21 U/L
BASOPHILS # BLD AUTO: 0.01 K/UL
BASOPHILS NFR BLD AUTO: 0.2 %
BILIRUB SERPL-MCNC: 0.8 MG/DL
BUN SERPL-MCNC: 21 MG/DL
CALCIUM SERPL-MCNC: 8.7 MG/DL
CHLORIDE SERPL-SCNC: 105 MMOL/L
CHOLEST SERPL-MCNC: 115 MG/DL
CHOLEST/HDLC SERPL: 3 RATIO
CO2 SERPL-SCNC: 26 MMOL/L
CREAT SERPL-MCNC: 1.2 MG/DL
EOSINOPHIL # BLD AUTO: 0.09 K/UL
EOSINOPHIL NFR BLD AUTO: 1.8 %
ESTIMATED AVERAGE GLUCOSE: 103 MG/DL
GLUCOSE SERPL-MCNC: 92 MG/DL
HBA1C MFR BLD HPLC: 5.2 %
HCT VFR BLD CALC: 44.5 %
HDLC SERPL-MCNC: 38 MG/DL
HGB BLD-MCNC: 14.3 G/DL
IMM GRANULOCYTES NFR BLD AUTO: 0.2 %
LDLC SERPL CALC-MCNC: 64 MG/DL
LYMPHOCYTES # BLD AUTO: 1.69 K/UL
LYMPHOCYTES NFR BLD AUTO: 33.8 %
MAN DIFF?: NORMAL
MCHC RBC-ENTMCNC: 30 PG
MCHC RBC-ENTMCNC: 32.1 G/DL
MCV RBC AUTO: 93.5 FL
MONOCYTES # BLD AUTO: 0.43 K/UL
MONOCYTES NFR BLD AUTO: 8.6 %
NEUTROPHILS # BLD AUTO: 2.77 K/UL
NEUTROPHILS NFR BLD AUTO: 55.4 %
PLATELET # BLD AUTO: 133 K/UL
POTASSIUM SERPL-SCNC: 3.9 MMOL/L
PROT SERPL-MCNC: 6.4 G/DL
RBC # BLD: 4.76 M/UL
RBC # FLD: 14 %
SODIUM SERPL-SCNC: 144 MMOL/L
TRIGL SERPL-MCNC: 70 MG/DL
WBC # FLD AUTO: 5 K/UL

## 2019-08-15 ENCOUNTER — CHART COPY (OUTPATIENT)
Age: 59
End: 2019-08-15

## 2019-09-23 ENCOUNTER — RX RENEWAL (OUTPATIENT)
Age: 59
End: 2019-09-23

## 2019-11-14 ENCOUNTER — OUTPATIENT (OUTPATIENT)
Dept: OUTPATIENT SERVICES | Facility: HOSPITAL | Age: 59
LOS: 1 days | Discharge: HOME | End: 2019-11-14

## 2019-11-14 ENCOUNTER — RX RENEWAL (OUTPATIENT)
Age: 59
End: 2019-11-14

## 2019-11-14 DIAGNOSIS — R35.1 NOCTURIA: ICD-10-CM

## 2019-11-14 DIAGNOSIS — Z80.42 FAMILY HISTORY OF MALIGNANT NEOPLASM OF PROSTATE: ICD-10-CM

## 2019-11-17 ENCOUNTER — RX RENEWAL (OUTPATIENT)
Age: 59
End: 2019-11-17

## 2019-12-05 ENCOUNTER — OUTPATIENT (OUTPATIENT)
Dept: OUTPATIENT SERVICES | Facility: HOSPITAL | Age: 59
LOS: 1 days | Discharge: HOME | End: 2019-12-05

## 2019-12-05 DIAGNOSIS — I10 ESSENTIAL (PRIMARY) HYPERTENSION: ICD-10-CM

## 2019-12-05 DIAGNOSIS — I25.10 ATHEROSCLEROTIC HEART DISEASE OF NATIVE CORONARY ARTERY WITHOUT ANGINA PECTORIS: ICD-10-CM

## 2019-12-05 DIAGNOSIS — R73.03 PREDIABETES: ICD-10-CM

## 2019-12-05 DIAGNOSIS — E78.5 HYPERLIPIDEMIA, UNSPECIFIED: ICD-10-CM

## 2019-12-05 DIAGNOSIS — K21.0 GASTRO-ESOPHAGEAL REFLUX DISEASE WITH ESOPHAGITIS: ICD-10-CM

## 2019-12-08 LAB
ALBUMIN SERPL ELPH-MCNC: 4.5 G/DL
ALP BLD-CCNC: 51 U/L
ALT SERPL-CCNC: 22 U/L
ANION GAP SERPL CALC-SCNC: 10 MMOL/L
AST SERPL-CCNC: 21 U/L
BASOPHILS # BLD AUTO: 0.02 K/UL
BASOPHILS NFR BLD AUTO: 0.4 %
BILIRUB SERPL-MCNC: 0.9 MG/DL
BUN SERPL-MCNC: 20 MG/DL
CALCIUM SERPL-MCNC: 9.3 MG/DL
CHLORIDE SERPL-SCNC: 106 MMOL/L
CHOLEST SERPL-MCNC: 134 MG/DL
CHOLEST/HDLC SERPL: 3.2 RATIO
CO2 SERPL-SCNC: 29 MMOL/L
CREAT SERPL-MCNC: 1.2 MG/DL
EOSINOPHIL # BLD AUTO: 0.17 K/UL
EOSINOPHIL NFR BLD AUTO: 3.5 %
GLUCOSE SERPL-MCNC: 103 MG/DL
HCT VFR BLD CALC: 46.1 %
HDLC SERPL-MCNC: 42 MG/DL
HGB BLD-MCNC: 14.9 G/DL
IMM GRANULOCYTES NFR BLD AUTO: 0.2 %
LDLC SERPL CALC-MCNC: 81 MG/DL
LYMPHOCYTES # BLD AUTO: 1.74 K/UL
LYMPHOCYTES NFR BLD AUTO: 35.4 %
MAN DIFF?: NORMAL
MCHC RBC-ENTMCNC: 30 PG
MCHC RBC-ENTMCNC: 32.3 G/DL
MCV RBC AUTO: 92.8 FL
MONOCYTES # BLD AUTO: 0.46 K/UL
MONOCYTES NFR BLD AUTO: 9.4 %
NEUTROPHILS # BLD AUTO: 2.51 K/UL
NEUTROPHILS NFR BLD AUTO: 51.1 %
PLATELET # BLD AUTO: 157 K/UL
POTASSIUM SERPL-SCNC: 4.3 MMOL/L
PROT SERPL-MCNC: 6.5 G/DL
RBC # BLD: 4.97 M/UL
RBC # FLD: 13.7 %
SODIUM SERPL-SCNC: 145 MMOL/L
TRIGL SERPL-MCNC: 105 MG/DL
WBC # FLD AUTO: 4.91 K/UL

## 2019-12-13 ENCOUNTER — APPOINTMENT (OUTPATIENT)
Dept: CARDIOLOGY | Facility: CLINIC | Age: 59
End: 2019-12-13
Payer: COMMERCIAL

## 2019-12-13 VITALS — SYSTOLIC BLOOD PRESSURE: 130 MMHG | DIASTOLIC BLOOD PRESSURE: 80 MMHG

## 2019-12-13 VITALS
BODY MASS INDEX: 34.81 KG/M2 | WEIGHT: 257 LBS | DIASTOLIC BLOOD PRESSURE: 90 MMHG | HEART RATE: 68 BPM | SYSTOLIC BLOOD PRESSURE: 140 MMHG | HEIGHT: 72 IN

## 2019-12-13 DIAGNOSIS — D48.5 NEOPLASM OF UNCERTAIN BEHAVIOR OF SKIN: ICD-10-CM

## 2019-12-13 PROCEDURE — 93000 ELECTROCARDIOGRAM COMPLETE: CPT

## 2019-12-13 PROCEDURE — 99214 OFFICE O/P EST MOD 30 MIN: CPT

## 2019-12-13 NOTE — ASSESSMENT
[FreeTextEntry1] : The patient has distal LAD disease which is significant but the segment is too small for intervention. RCA and LCX stents are patent. Mid LAD lesion is not significant by FFR . Has chest pain only when upset. No ECG changes. The patient has had aggressive CAD  and has an area of the distal LAD which is not amenable to intervention . ETT Echo was negative for ischemia at high exercise load in 6-18 . No chest with over 9 minutes of exercise. His symptoms are not similar to the symptoms he had prior to the interventions. LDL is at goal He has lost some weight . He has had a torn mensicus in his right knee and he has been exercising on a stationary bike . \par The pateint has remained unchanged since last visit.

## 2019-12-13 NOTE — DATA REVIEWED
[FreeTextEntry1] :  Biopsy             Final\par \par \par   STEFANIE POTTS                        \par \par                 Surgical Final Report\par \par \par         Final Diagnosis\par           1. Left neck, pigmented lesion, excision:\par            - Seborrheic keratosis.\par \par           2. Anterior neck, pigmented lesion, excision:\par            - Seborrheic keratosis.\par \par           3. Skin tags, left neck, right axilla x 2, right groin and left\par           lateral canthus, excision:\par            - Two polypoid intradermal melanocytic nevi, skin tags, and\par           polypoid seborrheic keratosis.\par \par           Verified by: Marina Avalos M.D.\par           (Electronic Signature)\par           Reported on: 02/21/19 08:36 EST, [facility],\[place]           [place] [address]\par           _________________________________________________________________\par \par           Clinical History\par           Excision of left neck pigmented lesion, excision of anterior neck\par           pigmented lesion, excision of left neck, right axilla x2, right\par           groin, left lateral canthus skin tags\par \par           Specimen(s) Submitted\par           1     Left neck pigmented lesion\par           2     Anterior neck pigmented lesion\par           3     Left neck, Right axilla, Right groin, Left lateral canthus\par           skin tags\par \par           Gross Description\par           1. The specimen is received in formalin, labeled "left neck\par           pigmented lesion" and consists of an unoriented ellipse of tan\par           skin measuring 1.5 x 0.7 x 0.3 cm On the skin surface there is\par           one raised grey lesion measuring 0.9 x 0.7 cm, < 0.1 cm from the\par           nearest margin. The base of the specimen is inked black. The\par           specimen is submitted entirely.\par \par           Summary of Sections:\par \par           1A - Tips, shaved - 1\par           1B - Remaining serially sectioned  specimen - 1\par \par           Total blocks: 2\par \par           2. The specimen is received in formalin, and labeled "anterior\par           neck pigmented lesion" and consists of an unoriented ellipse of\par           tan skin measuring 0.7 x 0.3 x 0.3 cm. On the skin surface, there\par           is a raised grey lesion measuring 0.4 x 0.3\par \par \par \par \par \par           STEFANIE POTTS                        2\par \par \par \par                  Surgical Final Report\par \par \par \par \par           cm. It is < 0.1 cm from the nearest margin. The base of the\par           specimen is inked black, the specimen is trisected. The specimen\par           is submitted entirely. (1 block)\par \par           3. The specimen is received in formalin, and labeled "left\par           lateral canthus skin tag" and consists of six polypoid fragments\par           of tan skin, measuring 0.2 x 0.1 x 0.1 cm through 1 x 0.7 x 0.5\par           cm. The bases of the two largest fragments are inked black, both\par           are bisected. The specimen is submitted entirely. (2 blocks)\par \par           Specimen was received and underwent gross examination at [facility], [address] [place] [address],\[place] [address].\par \par           02/18/19 13:54 ns\par \par           Perioperative Diagnosis\par           D48.5 - Neoplasm of uncertain behavior of skin\par \par  \par \par  Ordered by: ROSEY CHINO IV       Collected/Examined: [id number] 12:36PM       \par Verification Required       Stage: Final       \par  Performed at: [facility] (Med Director: Wilner Clay M.D)       Resulted: [id number] 08:36AM       Last Updated: 21Feb2019 08:36AM       Accession: [id number]

## 2019-12-13 NOTE — HISTORY OF PRESENT ILLNESS
[FreeTextEntry1] : The patient has been feeling well. His GERD symptoms have improved . No chest pain similar to his cardiac issues . The patient has had prior stents in his LCX and RCA . He has distal LAD disease . . His symptoms have not changed .

## 2019-12-13 NOTE — PHYSICAL EXAM
[General Appearance - Well Developed] : well developed [Normal Appearance] : normal appearance [Well Groomed] : well groomed [General Appearance - Well Nourished] : well nourished [No Deformities] : no deformities [General Appearance - In No Acute Distress] : no acute distress [Normal Conjunctiva] : the conjunctiva exhibited no abnormalities [Eyelids - No Xanthelasma] : the eyelids demonstrated no xanthelasmas [Normal Oral Mucosa] : normal oral mucosa [No Oral Pallor] : no oral pallor [No Oral Cyanosis] : no oral cyanosis [Exaggerated Use Of Accessory Muscles For Inspiration] : no accessory muscle use [Respiration, Rhythm And Depth] : normal respiratory rhythm and effort [Auscultation Breath Sounds / Voice Sounds] : lungs were clear to auscultation bilaterally [Abdomen Soft] : soft [Abdomen Tenderness] : non-tender [Abdomen Mass (___ Cm)] : no abdominal mass palpated [Gait - Sufficient For Exercise Testing] : the gait was sufficient for exercise testing [Abnormal Walk] : normal gait [Cyanosis, Localized] : no localized cyanosis [Nail Clubbing] : no clubbing of the fingernails [Petechial Hemorrhages (___cm)] : no petechial hemorrhages [Skin Color & Pigmentation] : normal skin color and pigmentation [] : no rash [No Venous Stasis] : no venous stasis [Skin Lesions] : no skin lesions [No Skin Ulcers] : no skin ulcer [No Xanthoma] : no  xanthoma was observed [Oriented To Time, Place, And Person] : oriented to person, place, and time [Affect] : the affect was normal [Mood] : the mood was normal [No Anxiety] : not feeling anxious [Normal Rate] : normal [Rhythm Regular] : regular [1+] : left 1+ [FreeTextEntry1] : No JVD  [de-identified] : well-appearing male, NAD [de-identified] : right neck incisions clean and dry, no evidence of cellulitis or fluid collection, slight dog ear deformity, left neck shave site c/d/i [de-identified] : right axilla, left neck, right groin and left lateral canthal shave sites healing appropriately, c/d/i, no evidence of cellulitis  [Rt] : no varicose veins of the right leg [Lt] : no varicose veins of the left leg

## 2020-04-21 LAB
ALBUMIN SERPL ELPH-MCNC: 4.2 G/DL
ALP BLD-CCNC: 51 U/L
ALT SERPL-CCNC: 24 U/L
ANION GAP SERPL CALC-SCNC: 10 MMOL/L
AST SERPL-CCNC: 25 U/L
BASOPHILS # BLD AUTO: 0.01 K/UL
BASOPHILS NFR BLD AUTO: 0.2 %
BILIRUB SERPL-MCNC: 0.6 MG/DL
BUN SERPL-MCNC: 25 MG/DL
CALCIUM SERPL-MCNC: 9 MG/DL
CHLORIDE SERPL-SCNC: 104 MMOL/L
CHOLEST SERPL-MCNC: 122 MG/DL
CHOLEST/HDLC SERPL: 3.4 RATIO
CO2 SERPL-SCNC: 28 MMOL/L
CREAT SERPL-MCNC: 1.2 MG/DL
EOSINOPHIL # BLD AUTO: 0.11 K/UL
EOSINOPHIL NFR BLD AUTO: 2.5 %
ESTIMATED AVERAGE GLUCOSE: 108 MG/DL
GLUCOSE SERPL-MCNC: 88 MG/DL
HBA1C MFR BLD HPLC: 5.4 %
HCT VFR BLD CALC: 46.1 %
HDLC SERPL-MCNC: 36 MG/DL
HGB BLD-MCNC: 14.3 G/DL
IMM GRANULOCYTES NFR BLD AUTO: 0.2 %
LDLC SERPL CALC-MCNC: 73 MG/DL
LYMPHOCYTES # BLD AUTO: 1.75 K/UL
LYMPHOCYTES NFR BLD AUTO: 39.1 %
MAN DIFF?: NORMAL
MCHC RBC-ENTMCNC: 29.4 PG
MCHC RBC-ENTMCNC: 31 G/DL
MCV RBC AUTO: 94.9 FL
MONOCYTES # BLD AUTO: 0.44 K/UL
MONOCYTES NFR BLD AUTO: 9.8 %
NEUTROPHILS # BLD AUTO: 2.16 K/UL
NEUTROPHILS NFR BLD AUTO: 48.2 %
PLATELET # BLD AUTO: 161 K/UL
POTASSIUM SERPL-SCNC: 4.6 MMOL/L
PROT SERPL-MCNC: 6.3 G/DL
RBC # BLD: 4.86 M/UL
RBC # FLD: 14 %
SODIUM SERPL-SCNC: 142 MMOL/L
TRIGL SERPL-MCNC: 129 MG/DL
WBC # FLD AUTO: 4.48 K/UL

## 2020-04-27 ENCOUNTER — APPOINTMENT (OUTPATIENT)
Dept: CARDIOLOGY | Facility: CLINIC | Age: 60
End: 2020-04-27
Payer: COMMERCIAL

## 2020-04-27 VITALS — BODY MASS INDEX: 35 KG/M2 | HEIGHT: 71 IN | WEIGHT: 250 LBS

## 2020-04-27 PROCEDURE — 99214 OFFICE O/P EST MOD 30 MIN: CPT | Mod: 95

## 2020-04-27 NOTE — PHYSICAL EXAM
[General Appearance - Well Developed] : well developed [Normal Appearance] : normal appearance [No Deformities] : no deformities [Well Groomed] : well groomed [General Appearance - Well Nourished] : well nourished [Normal Conjunctiva] : the conjunctiva exhibited no abnormalities [General Appearance - In No Acute Distress] : no acute distress [Eyelids - No Xanthelasma] : the eyelids demonstrated no xanthelasmas [No Oral Pallor] : no oral pallor [Normal Oral Mucosa] : normal oral mucosa [Respiration, Rhythm And Depth] : normal respiratory rhythm and effort [No Oral Cyanosis] : no oral cyanosis [Exaggerated Use Of Accessory Muscles For Inspiration] : no accessory muscle use [Abdomen Mass (___ Cm)] : no abdominal mass palpated [Abnormal Walk] : normal gait [Cyanosis, Localized] : no localized cyanosis [Nail Clubbing] : no clubbing of the fingernails [Petechial Hemorrhages (___cm)] : no petechial hemorrhages [Skin Color & Pigmentation] : normal skin color and pigmentation [No Venous Stasis] : no venous stasis [] : no ischemic changes [No Xanthoma] : no  xanthoma was observed [No Skin Ulcers] : no skin ulcer [Skin Lesions] : no skin lesions [Affect] : the affect was normal [Mood] : the mood was normal [Oriented To Time, Place, And Person] : oriented to person, place, and time [No Anxiety] : not feeling anxious [FreeTextEntry1] : No JVD  [Rt] : no varicose veins of the right leg [Lt] : no varicose veins of the left leg

## 2020-04-27 NOTE — ASSESSMENT
[FreeTextEntry1] : The patient has distal LAD disease which is significant but the segment is too small for intervention. RCA and LCX stents are patent. Mid LAD lesion is not significant by FFR . Has chest pain only when upset. No ECG changes. The patient has had aggressive CAD  and has an area of the distal LAD which is not amenable to intervention . ETT Echo was negative for ischemia at high exercise load in 6-18 . No chest with over 9 minutes of exercise. His symptoms are not similar to the symptoms he had prior to the interventions. LDL is at goal He has lost some weight . He has had a torn mensicus in his right knee and he has been exercising on a stationary bike without issues or angina . He does have erectile dysfunction . Treatment options are difficult in view of nitrates. He is due for a stress test during the summer . Will reevaluate . Possible would be able to come off of Nitrates and substitute calcium channel blockers  if need to

## 2020-04-27 NOTE — REASON FOR VISIT
[Follow-Up - Clinic] : a clinic follow-up of [Coronary Artery Disease] : coronary artery disease [Hypertension] : hypertension [Hyperlipidemia] : hyperlipidemia [Post Op: _________] : a [unfilled] post op visit

## 2020-04-27 NOTE — HISTORY OF PRESENT ILLNESS
[Home] : at home, [unfilled] , at the time of the visit. [Other Location: e.g. Home (Enter Location, City,State)___] : at [unfilled] [FreeTextEntry1] : The patient has been feeling well. His GERD symptoms have improved . No chest pain similar to his cardiac issues . Able to exercise on his stationary bike .  The patient has had prior stents in his LCX and RCA . He has distal LAD disease . . His symptoms have not changed . Has lost 8 pounds

## 2020-04-27 NOTE — DATA REVIEWED
[FreeTextEntry1] :  Biopsy             Final\par \par \par   STEFANIE POTTS                        \par \par                 Surgical Final Report\par \par \par         Final Diagnosis\par           1. Left neck, pigmented lesion, excision:\par            - Seborrheic keratosis.\par \par           2. Anterior neck, pigmented lesion, excision:\par            - Seborrheic keratosis.\par \par           3. Skin tags, left neck, right axilla x 2, right groin and left\par           lateral canthus, excision:\par            - Two polypoid intradermal melanocytic nevi, skin tags, and\par           polypoid seborrheic keratosis.\par \par           Verified by: Marina Avalos M.D.\par           (Electronic Signature)\par           Reported on: 02/21/19 08:36 EST, [facility],\[place]           [place] [address]\par           _________________________________________________________________\par \par           Clinical History\par           Excision of left neck pigmented lesion, excision of anterior neck\par           pigmented lesion, excision of left neck, right axilla x2, right\par           groin, left lateral canthus skin tags\par \par           Specimen(s) Submitted\par           1     Left neck pigmented lesion\par           2     Anterior neck pigmented lesion\par           3     Left neck, Right axilla, Right groin, Left lateral canthus\par           skin tags\par \par           Gross Description\par           1. The specimen is received in formalin, labeled "left neck\par           pigmented lesion" and consists of an unoriented ellipse of tan\par           skin measuring 1.5 x 0.7 x 0.3 cm On the skin surface there is\par           one raised grey lesion measuring 0.9 x 0.7 cm, < 0.1 cm from the\par           nearest margin. The base of the specimen is inked black. The\par           specimen is submitted entirely.\par \par           Summary of Sections:\par \par           1A - Tips, shaved - 1\par           1B - Remaining serially sectioned  specimen - 1\par \par           Total blocks: 2\par \par           2. The specimen is received in formalin, and labeled "anterior\par           neck pigmented lesion" and consists of an unoriented ellipse of\par           tan skin measuring 0.7 x 0.3 x 0.3 cm. On the skin surface, there\par           is a raised grey lesion measuring 0.4 x 0.3\par \par \par \par \par \par           STEFANIE POTTS                        2\par \par \par \par                  Surgical Final Report\par \par \par \par \par           cm. It is < 0.1 cm from the nearest margin. The base of the\par           specimen is inked black, the specimen is trisected. The specimen\par           is submitted entirely. (1 block)\par \par           3. The specimen is received in formalin, and labeled "left\par           lateral canthus skin tag" and consists of six polypoid fragments\par           of tan skin, measuring 0.2 x 0.1 x 0.1 cm through 1 x 0.7 x 0.5\par           cm. The bases of the two largest fragments are inked black, both\par           are bisected. The specimen is submitted entirely. (2 blocks)\par \par           Specimen was received and underwent gross examination at [facility], [address] [place] [address],\[place] [address].\par \par           02/18/19 13:54 ns\par \par           Perioperative Diagnosis\par           D48.5 - Neoplasm of uncertain behavior of skin\par \par  \par \par  Ordered by: ROSEY CHINO IV       Collected/Examined: [id number] 12:36PM       \par Verification Required       Stage: Final       \par  Performed at: [facility] (Med Director: Wilner Clay M.D)       Resulted: [id number] 08:36AM       Last Updated: 21Feb2019 08:36AM       Accession: [id number]

## 2020-08-04 ENCOUNTER — APPOINTMENT (OUTPATIENT)
Dept: CARDIOLOGY | Facility: CLINIC | Age: 60
End: 2020-08-04

## 2020-08-13 LAB
ALBUMIN SERPL ELPH-MCNC: 4.4 G/DL
ALP BLD-CCNC: 50 U/L
ALT SERPL-CCNC: 20 U/L
ANION GAP SERPL CALC-SCNC: 10 MMOL/L
AST SERPL-CCNC: 22 U/L
BASOPHILS # BLD AUTO: 0.01 K/UL
BASOPHILS NFR BLD AUTO: 0.2 %
BILIRUB SERPL-MCNC: 0.8 MG/DL
BUN SERPL-MCNC: 18 MG/DL
CALCIUM SERPL-MCNC: 9.3 MG/DL
CHLORIDE SERPL-SCNC: 105 MMOL/L
CHOLEST SERPL-MCNC: 125 MG/DL
CHOLEST/HDLC SERPL: 3.3 RATIO
CO2 SERPL-SCNC: 27 MMOL/L
CREAT SERPL-MCNC: 1.2 MG/DL
EOSINOPHIL # BLD AUTO: 0.12 K/UL
EOSINOPHIL NFR BLD AUTO: 2.6 %
GLUCOSE SERPL-MCNC: 92 MG/DL
HCT VFR BLD CALC: 46.7 %
HDLC SERPL-MCNC: 38 MG/DL
HGB BLD-MCNC: 14.6 G/DL
IMM GRANULOCYTES NFR BLD AUTO: 0.2 %
LDLC SERPL CALC-MCNC: 74 MG/DL
LYMPHOCYTES # BLD AUTO: 1.82 K/UL
LYMPHOCYTES NFR BLD AUTO: 39.1 %
MAN DIFF?: NORMAL
MCHC RBC-ENTMCNC: 29.5 PG
MCHC RBC-ENTMCNC: 31.3 G/DL
MCV RBC AUTO: 94.3 FL
MONOCYTES # BLD AUTO: 0.36 K/UL
MONOCYTES NFR BLD AUTO: 7.7 %
NEUTROPHILS # BLD AUTO: 2.33 K/UL
NEUTROPHILS NFR BLD AUTO: 50.2 %
PLATELET # BLD AUTO: 143 K/UL
POTASSIUM SERPL-SCNC: 4.5 MMOL/L
PROT SERPL-MCNC: 6.2 G/DL
RBC # BLD: 4.95 M/UL
RBC # FLD: 13.9 %
SODIUM SERPL-SCNC: 142 MMOL/L
TRIGL SERPL-MCNC: 95 MG/DL
WBC # FLD AUTO: 4.65 K/UL

## 2020-08-18 ENCOUNTER — APPOINTMENT (OUTPATIENT)
Dept: CARDIOLOGY | Facility: CLINIC | Age: 60
End: 2020-08-18
Payer: COMMERCIAL

## 2020-08-18 VITALS
BODY MASS INDEX: 35.7 KG/M2 | HEART RATE: 63 BPM | HEIGHT: 71 IN | DIASTOLIC BLOOD PRESSURE: 76 MMHG | TEMPERATURE: 96.5 F | WEIGHT: 255 LBS | SYSTOLIC BLOOD PRESSURE: 120 MMHG

## 2020-08-18 DIAGNOSIS — L82.1 OTHER SEBORRHEIC KERATOSIS: ICD-10-CM

## 2020-08-18 DIAGNOSIS — I25.10 ATHEROSCLEROTIC HEART DISEASE OF NATIVE CORONARY ARTERY W/OUT ANGINA PECTORIS: ICD-10-CM

## 2020-08-18 PROCEDURE — 99214 OFFICE O/P EST MOD 30 MIN: CPT

## 2020-08-18 PROCEDURE — 93000 ELECTROCARDIOGRAM COMPLETE: CPT

## 2020-08-18 NOTE — PHYSICAL EXAM
[General Appearance - Well Developed] : well developed [Normal Appearance] : normal appearance [Well Groomed] : well groomed [No Deformities] : no deformities [General Appearance - Well Nourished] : well nourished [General Appearance - In No Acute Distress] : no acute distress [Normal Conjunctiva] : the conjunctiva exhibited no abnormalities [Eyelids - No Xanthelasma] : the eyelids demonstrated no xanthelasmas [Normal Oral Mucosa] : normal oral mucosa [No Oral Pallor] : no oral pallor [No Oral Cyanosis] : no oral cyanosis [Respiration, Rhythm And Depth] : normal respiratory rhythm and effort [Exaggerated Use Of Accessory Muscles For Inspiration] : no accessory muscle use [Auscultation Breath Sounds / Voice Sounds] : lungs were clear to auscultation bilaterally [Abdomen Tenderness] : non-tender [Abdomen Soft] : soft [Abnormal Walk] : normal gait [Abdomen Mass (___ Cm)] : no abdominal mass palpated [Gait - Sufficient For Exercise Testing] : the gait was sufficient for exercise testing [Nail Clubbing] : no clubbing of the fingernails [Cyanosis, Localized] : no localized cyanosis [Petechial Hemorrhages (___cm)] : no petechial hemorrhages [] : no rash [Skin Color & Pigmentation] : normal skin color and pigmentation [No Venous Stasis] : no venous stasis [Skin Lesions] : no skin lesions [No Skin Ulcers] : no skin ulcer [No Xanthoma] : no  xanthoma was observed [Oriented To Time, Place, And Person] : oriented to person, place, and time [Affect] : the affect was normal [Mood] : the mood was normal [No Anxiety] : not feeling anxious [Normal Rate] : normal [Rhythm Regular] : regular [1+] : left 1+ [FreeTextEntry1] : No JVD  [de-identified] : right neck incisions clean and dry, no evidence of cellulitis or fluid collection, slight dog ear deformity, left neck shave site c/d/i [de-identified] : well-appearing male, NAD [de-identified] : right axilla, left neck, right groin and left lateral canthal shave sites healing appropriately, c/d/i, no evidence of cellulitis  [Rt] : no varicose veins of the right leg [Lt] : no varicose veins of the left leg

## 2020-08-18 NOTE — DATA REVIEWED
[FreeTextEntry1] :  Biopsy             Final\par \par \par   STEFANIE POTTS                        \par \par                 Surgical Final Report\par \par \par         Final Diagnosis\par           1. Left neck, pigmented lesion, excision:\par            - Seborrheic keratosis.\par \par           2. Anterior neck, pigmented lesion, excision:\par            - Seborrheic keratosis.\par \par           3. Skin tags, left neck, right axilla x 2, right groin and left\par           lateral canthus, excision:\par            - Two polypoid intradermal melanocytic nevi, skin tags, and\par           polypoid seborrheic keratosis.\par \par           Verified by: Marina Avalos M.D.\par           (Electronic Signature)\par           Reported on: 02/21/19 08:36 EST, [facility],\[place]           [place] [address]\par           _________________________________________________________________\par \par           Clinical History\par           Excision of left neck pigmented lesion, excision of anterior neck\par           pigmented lesion, excision of left neck, right axilla x2, right\par           groin, left lateral canthus skin tags\par \par           Specimen(s) Submitted\par           1     Left neck pigmented lesion\par           2     Anterior neck pigmented lesion\par           3     Left neck, Right axilla, Right groin, Left lateral canthus\par           skin tags\par \par           Gross Description\par           1. The specimen is received in formalin, labeled "left neck\par           pigmented lesion" and consists of an unoriented ellipse of tan\par           skin measuring 1.5 x 0.7 x 0.3 cm On the skin surface there is\par           one raised grey lesion measuring 0.9 x 0.7 cm, < 0.1 cm from the\par           nearest margin. The base of the specimen is inked black. The\par           specimen is submitted entirely.\par \par           Summary of Sections:\par \par           1A - Tips, shaved - 1\par           1B - Remaining serially sectioned  specimen - 1\par \par           Total blocks: 2\par \par           2. The specimen is received in formalin, and labeled "anterior\par           neck pigmented lesion" and consists of an unoriented ellipse of\par           tan skin measuring 0.7 x 0.3 x 0.3 cm. On the skin surface, there\par           is a raised grey lesion measuring 0.4 x 0.3\par \par \par \par \par \par           STEFANIE POTTS                        2\par \par \par \par                  Surgical Final Report\par \par \par \par \par           cm. It is < 0.1 cm from the nearest margin. The base of the\par           specimen is inked black, the specimen is trisected. The specimen\par           is submitted entirely. (1 block)\par \par           3. The specimen is received in formalin, and labeled "left\par           lateral canthus skin tag" and consists of six polypoid fragments\par           of tan skin, measuring 0.2 x 0.1 x 0.1 cm through 1 x 0.7 x 0.5\par           cm. The bases of the two largest fragments are inked black, both\par           are bisected. The specimen is submitted entirely. (2 blocks)\par \par           Specimen was received and underwent gross examination at [facility], [address] [place] [address],\[place] [address].\par \par           02/18/19 13:54 ns\par \par           Perioperative Diagnosis\par           D48.5 - Neoplasm of uncertain behavior of skin\par \par  \par \par  Ordered by: ROSEY CHINO IV       Collected/Examined: [id number] 12:36PM       \par Verification Required       Stage: Final       \par  Performed at: [facility] (Med Director: Wilner Clay M.D)       Resulted: [id number] 08:36AM       Last Updated: 21Feb2019 08:36AM       Accession: [id number]

## 2020-08-18 NOTE — ASSESSMENT
[FreeTextEntry1] : The patient has distal LAD disease which is significant but the segment is too small for intervention. RCA and LCX stents are patent. Mid LAD lesion is not significant by FFR . Has chest pain only when upset. No ECG changes. The patient has had aggressive CAD  and has an area of the distal LAD which is not amenable to intervention . ETT Echo was negative for ischemia at high exercise load in 6-18 . No chest with over 9 minutes of exercise. His symptoms are not similar to the symptoms he had prior to the interventions. LDL is at goal He has lost some weight . He has had a torn mensicus in his right knee and he has been exercising on a stationary bike . \par The pateint has remained unchanged since last visit. He was scheduled to have stress echo done but did not want to have this done while wearing a mask.

## 2020-08-18 NOTE — HISTORY OF PRESENT ILLNESS
[FreeTextEntry1] : The patient has not had symptooms similar to his cardiac pain . Has GERD like symptoms . The patient has had PCI and stents in the RCA and LCX and known distal LAD disease not ammenable to intervention and is being treated medically

## 2020-08-18 NOTE — DATA REVIEWED
[FreeTextEntry1] :  Biopsy             Final\par \par \par   STEFANIE POTTS                        \par \par                 Surgical Final Report\par \par \par         Final Diagnosis\par           1. Left neck, pigmented lesion, excision:\par            - Seborrheic keratosis.\par \par           2. Anterior neck, pigmented lesion, excision:\par            - Seborrheic keratosis.\par \par           3. Skin tags, left neck, right axilla x 2, right groin and left\par           lateral canthus, excision:\par            - Two polypoid intradermal melanocytic nevi, skin tags, and\par           polypoid seborrheic keratosis.\par \par           Verified by: Marina Avalos M.D.\par           (Electronic Signature)\par           Reported on: 02/21/19 08:36 EST, [facility] [place],\[place]           [place] [address]\par           _________________________________________________________________\par \par           Clinical History\par           Excision of left neck pigmented lesion, excision of anterior neck\par           pigmented lesion, excision of left neck, right axilla x2, right\par           groin, left lateral canthus skin tags\par \par           Specimen(s) Submitted\par           1     Left neck pigmented lesion\par           2     Anterior neck pigmented lesion\par           3     Left neck, Right axilla, Right groin, Left lateral canthus\par           skin tags\par \par           Gross Description\par           1. The specimen is received in formalin, labeled "left neck\par           pigmented lesion" and consists of an unoriented ellipse of tan\par           skin measuring 1.5 x 0.7 x 0.3 cm On the skin surface there is\par           one raised grey lesion measuring 0.9 x 0.7 cm, < 0.1 cm from the\par           nearest margin. The base of the specimen is inked black. The\par           specimen is submitted entirely.\par \par           Summary of Sections:\par \par           1A - Tips, shaved - 1\par           1B - Remaining serially sectioned  specimen - 1\par \par           Total blocks: 2\par \par           2. The specimen is received in formalin, and labeled "anterior\par           neck pigmented lesion" and consists of an unoriented ellipse of\par           tan skin measuring 0.7 x 0.3 x 0.3 cm. On the skin surface, there\par           is a raised grey lesion measuring 0.4 x 0.3\par \par \par \par \par \par           STEFANIE POTTS                        2\par \par \par \par                  Surgical Final Report\par \par \par \par \par           cm. It is < 0.1 cm from the nearest margin. The base of the\par           specimen is inked black, the specimen is trisected. The specimen\par           is submitted entirely. (1 block)\par \par           3. The specimen is received in formalin, and labeled "left\par           lateral canthus skin tag" and consists of six polypoid fragments\par           of tan skin, measuring 0.2 x 0.1 x 0.1 cm through 1 x 0.7 x 0.5\par           cm. The bases of the two largest fragments are inked black, both\par           are bisected. The specimen is submitted entirely. (2 blocks)\par \par           Specimen was received and underwent gross examination at [facility], [address] [place] [address],\[place] [address].\par \par           02/18/19 13:54 ns\par \par           Perioperative Diagnosis\par           D48.5 - Neoplasm of uncertain behavior of skin\par \par  \par \par  Ordered by: ROSEY CHINO IV       Collected/Examined: [id number] 12:36PM       \par Verification Required       Stage: Final       \par  Performed at: [facility] (Med Director: Wilner Clay M.D)       Resulted: [id number] 08:36AM       Last Updated: 21Feb2019 08:36AM       Accession: [id number]

## 2020-08-18 NOTE — PHYSICAL EXAM
[General Appearance - Well Developed] : well developed [Normal Appearance] : normal appearance [Well Groomed] : well groomed [General Appearance - Well Nourished] : well nourished [No Deformities] : no deformities [General Appearance - In No Acute Distress] : no acute distress [Normal Conjunctiva] : the conjunctiva exhibited no abnormalities [Eyelids - No Xanthelasma] : the eyelids demonstrated no xanthelasmas [No Oral Pallor] : no oral pallor [Normal Oral Mucosa] : normal oral mucosa [No Oral Cyanosis] : no oral cyanosis [Respiration, Rhythm And Depth] : normal respiratory rhythm and effort [Auscultation Breath Sounds / Voice Sounds] : lungs were clear to auscultation bilaterally [Exaggerated Use Of Accessory Muscles For Inspiration] : no accessory muscle use [Abdomen Tenderness] : non-tender [Abdomen Soft] : soft [Abdomen Mass (___ Cm)] : no abdominal mass palpated [Abnormal Walk] : normal gait [Gait - Sufficient For Exercise Testing] : the gait was sufficient for exercise testing [Nail Clubbing] : no clubbing of the fingernails [Cyanosis, Localized] : no localized cyanosis [Petechial Hemorrhages (___cm)] : no petechial hemorrhages [Skin Color & Pigmentation] : normal skin color and pigmentation [] : no rash [No Venous Stasis] : no venous stasis [Skin Lesions] : no skin lesions [No Skin Ulcers] : no skin ulcer [Oriented To Time, Place, And Person] : oriented to person, place, and time [No Xanthoma] : no  xanthoma was observed [Affect] : the affect was normal [Mood] : the mood was normal [No Anxiety] : not feeling anxious [Normal Rate] : normal [Rhythm Regular] : regular [1+] : left 1+ [FreeTextEntry1] : No JVD  [de-identified] : well-appearing male, NAD [de-identified] : right neck incisions clean and dry, no evidence of cellulitis or fluid collection, slight dog ear deformity, left neck shave site c/d/i [de-identified] : right axilla, left neck, right groin and left lateral canthal shave sites healing appropriately, c/d/i, no evidence of cellulitis  [Rt] : no varicose veins of the right leg [Lt] : no varicose veins of the left leg

## 2020-10-07 ENCOUNTER — APPOINTMENT (OUTPATIENT)
Dept: CARDIOLOGY | Facility: CLINIC | Age: 60
End: 2020-10-07
Payer: COMMERCIAL

## 2020-10-07 PROCEDURE — 93320 DOPPLER ECHO COMPLETE: CPT

## 2020-10-07 PROCEDURE — 93325 DOPPLER ECHO COLOR FLOW MAPG: CPT

## 2020-10-07 PROCEDURE — 93351 STRESS TTE COMPLETE: CPT

## 2020-12-17 LAB
ALBUMIN SERPL ELPH-MCNC: 4.5 G/DL
ALP BLD-CCNC: 54 U/L
ALT SERPL-CCNC: 19 U/L
ANION GAP SERPL CALC-SCNC: 10 MMOL/L
AST SERPL-CCNC: 22 U/L
BASOPHILS # BLD AUTO: 0.01 K/UL
BASOPHILS NFR BLD AUTO: 0.2 %
BILIRUB SERPL-MCNC: 0.8 MG/DL
BUN SERPL-MCNC: 16 MG/DL
CALCIUM SERPL-MCNC: 9.4 MG/DL
CHLORIDE SERPL-SCNC: 108 MMOL/L
CHOLEST SERPL-MCNC: 131 MG/DL
CO2 SERPL-SCNC: 28 MMOL/L
CREAT SERPL-MCNC: 1.2 MG/DL
EOSINOPHIL # BLD AUTO: 0.12 K/UL
EOSINOPHIL NFR BLD AUTO: 2.9 %
ESTIMATED AVERAGE GLUCOSE: 114 MG/DL
GLUCOSE SERPL-MCNC: 99 MG/DL
HBA1C MFR BLD HPLC: 5.6 %
HCT VFR BLD CALC: 46 %
HDLC SERPL-MCNC: 40 MG/DL
HGB BLD-MCNC: 14.7 G/DL
IMM GRANULOCYTES NFR BLD AUTO: 0.2 %
LDLC SERPL CALC-MCNC: 80 MG/DL
LYMPHOCYTES # BLD AUTO: 1.47 K/UL
LYMPHOCYTES NFR BLD AUTO: 35.7 %
MAN DIFF?: NORMAL
MCHC RBC-ENTMCNC: 29.1 PG
MCHC RBC-ENTMCNC: 32 G/DL
MCV RBC AUTO: 91.1 FL
MONOCYTES # BLD AUTO: 0.31 K/UL
MONOCYTES NFR BLD AUTO: 7.5 %
NEUTROPHILS # BLD AUTO: 2.2 K/UL
NEUTROPHILS NFR BLD AUTO: 53.5 %
NONHDLC SERPL-MCNC: 91 MG/DL
PLATELET # BLD AUTO: 153 K/UL
POTASSIUM SERPL-SCNC: 4.7 MMOL/L
PROT SERPL-MCNC: 6.4 G/DL
RBC # BLD: 5.05 M/UL
RBC # FLD: 13.4 %
SODIUM SERPL-SCNC: 146 MMOL/L
TRIGL SERPL-MCNC: 88 MG/DL
WBC # FLD AUTO: 4.12 K/UL

## 2020-12-21 ENCOUNTER — APPOINTMENT (OUTPATIENT)
Dept: CARDIOLOGY | Facility: CLINIC | Age: 60
End: 2020-12-21
Payer: COMMERCIAL

## 2020-12-21 VITALS
HEIGHT: 71 IN | SYSTOLIC BLOOD PRESSURE: 128 MMHG | WEIGHT: 255 LBS | HEART RATE: 70 BPM | BODY MASS INDEX: 35.7 KG/M2 | DIASTOLIC BLOOD PRESSURE: 86 MMHG | TEMPERATURE: 96.3 F

## 2020-12-21 PROCEDURE — 99214 OFFICE O/P EST MOD 30 MIN: CPT

## 2020-12-21 PROCEDURE — 93000 ELECTROCARDIOGRAM COMPLETE: CPT

## 2020-12-21 PROCEDURE — 99072 ADDL SUPL MATRL&STAF TM PHE: CPT

## 2020-12-21 NOTE — ASSESSMENT
[FreeTextEntry1] : The patient has distal LAD disease which is significant but the segment is too small for intervention. RCA and LCX stents are patent. Mid LAD lesion is not significant by FFR . Has chest pain only when upset. No ECG changes. The patient has had aggressive CAD  and has an area of the distal LAD which is not amenable to intervention . ETT Echo was negative for ischemia at high exercise load in 6-18 and  . . No chest with over 9 minutes of exercise. His symptoms are not similar to the symptoms he had prior to the interventions and these are improved. . LDL is at goal He has lost some weight . He has had a torn mensicus in his right knee and he has been exercising on a stationary bike . \par

## 2020-12-21 NOTE — DATA REVIEWED
[FreeTextEntry1] :  Biopsy             Final\par \par \par   STEFANIE POTTS                        \par \par                 Surgical Final Report\par \par \par         Final Diagnosis\par           1. Left neck, pigmented lesion, excision:\par            - Seborrheic keratosis.\par \par           2. Anterior neck, pigmented lesion, excision:\par            - Seborrheic keratosis.\par \par           3. Skin tags, left neck, right axilla x 2, right groin and left\par           lateral canthus, excision:\par            - Two polypoid intradermal melanocytic nevi, skin tags, and\par           polypoid seborrheic keratosis.\par \par           Verified by: Marina Avalos M.D.\par           (Electronic Signature)\par           Reported on: 02/21/19 08:36 EST, [facility],\[place]           [place] [address]\par           _________________________________________________________________\par \par           Clinical History\par           Excision of left neck pigmented lesion, excision of anterior neck\par           pigmented lesion, excision of left neck, right axilla x2, right\par           groin, left lateral canthus skin tags\par \par           Specimen(s) Submitted\par           1     Left neck pigmented lesion\par           2     Anterior neck pigmented lesion\par           3     Left neck, Right axilla, Right groin, Left lateral canthus\par           skin tags\par \par           Gross Description\par           1. The specimen is received in formalin, labeled "left neck\par           pigmented lesion" and consists of an unoriented ellipse of tan\par           skin measuring 1.5 x 0.7 x 0.3 cm On the skin surface there is\par           one raised grey lesion measuring 0.9 x 0.7 cm, < 0.1 cm from the\par           nearest margin. The base of the specimen is inked black. The\par           specimen is submitted entirely.\par \par           Summary of Sections:\par \par           1A - Tips, shaved - 1\par           1B - Remaining serially sectioned  specimen - 1\par \par           Total blocks: 2\par \par           2. The specimen is received in formalin, and labeled "anterior\par           neck pigmented lesion" and consists of an unoriented ellipse of\par           tan skin measuring 0.7 x 0.3 x 0.3 cm. On the skin surface, there\par           is a raised grey lesion measuring 0.4 x 0.3\par \par \par \par \par \par           STEFANIE POTTS                        2\par \par \par \par                  Surgical Final Report\par \par \par \par \par           cm. It is < 0.1 cm from the nearest margin. The base of the\par           specimen is inked black, the specimen is trisected. The specimen\par           is submitted entirely. (1 block)\par \par           3. The specimen is received in formalin, and labeled "left\par           lateral canthus skin tag" and consists of six polypoid fragments\par           of tan skin, measuring 0.2 x 0.1 x 0.1 cm through 1 x 0.7 x 0.5\par           cm. The bases of the two largest fragments are inked black, both\par           are bisected. The specimen is submitted entirely. (2 blocks)\par \par           Specimen was received and underwent gross examination at [facility], [address] [place] [address],\[place] [address].\par \par           02/18/19 13:54 ns\par \par           Perioperative Diagnosis\par           D48.5 - Neoplasm of uncertain behavior of skin\par \par  \par \par  Ordered by: ROSEY CHINO IV       Collected/Examined: [id number] 12:36PM       \par Verification Required       Stage: Final       \par  Performed at: [facility] (Med Director: Wilner Clay M.D)       Resulted: [id number] 08:36AM       Last Updated: 21Feb2019 08:36AM       Accession: [id number]

## 2020-12-21 NOTE — HISTORY OF PRESENT ILLNESS
[FreeTextEntry1] : The patient has been feeling well overall. CP is reproducible by eating certain foods, certain dairy and fried foods  . Symptoms of CP have actually improved

## 2020-12-21 NOTE — PHYSICAL EXAM
[General Appearance - Well Developed] : well developed [Normal Appearance] : normal appearance [Well Groomed] : well groomed [General Appearance - Well Nourished] : well nourished [No Deformities] : no deformities [General Appearance - In No Acute Distress] : no acute distress [Normal Conjunctiva] : the conjunctiva exhibited no abnormalities [Eyelids - No Xanthelasma] : the eyelids demonstrated no xanthelasmas [Normal Oral Mucosa] : normal oral mucosa [No Oral Pallor] : no oral pallor [No Oral Cyanosis] : no oral cyanosis [Respiration, Rhythm And Depth] : normal respiratory rhythm and effort [Exaggerated Use Of Accessory Muscles For Inspiration] : no accessory muscle use [Auscultation Breath Sounds / Voice Sounds] : lungs were clear to auscultation bilaterally [Abdomen Soft] : soft [Abdomen Tenderness] : non-tender [Abdomen Mass (___ Cm)] : no abdominal mass palpated [Abnormal Walk] : normal gait [Gait - Sufficient For Exercise Testing] : the gait was sufficient for exercise testing [Nail Clubbing] : no clubbing of the fingernails [Cyanosis, Localized] : no localized cyanosis [Petechial Hemorrhages (___cm)] : no petechial hemorrhages [Skin Color & Pigmentation] : normal skin color and pigmentation [] : no rash [No Venous Stasis] : no venous stasis [Skin Lesions] : no skin lesions [No Skin Ulcers] : no skin ulcer [No Xanthoma] : no  xanthoma was observed [Oriented To Time, Place, And Person] : oriented to person, place, and time [Affect] : the affect was normal [Mood] : the mood was normal [No Anxiety] : not feeling anxious [Normal Rate] : normal [Rhythm Regular] : regular [1+] : left 1+ [FreeTextEntry1] : No JVD  [de-identified] : well-appearing male, NAD [de-identified] : right neck incisions clean and dry, no evidence of cellulitis or fluid collection, slight dog ear deformity, left neck shave site c/d/i [de-identified] : right axilla, left neck, right groin and left lateral canthal shave sites healing appropriately, c/d/i, no evidence of cellulitis  [Rt] : no varicose veins of the right leg [Lt] : no varicose veins of the left leg

## 2021-04-26 LAB
ALBUMIN SERPL ELPH-MCNC: 4.3 G/DL
ALP BLD-CCNC: 52 U/L
ALT SERPL-CCNC: 25 U/L
ANION GAP SERPL CALC-SCNC: 10 MMOL/L
AST SERPL-CCNC: 25 U/L
BASOPHILS # BLD AUTO: 0.01 K/UL
BASOPHILS NFR BLD AUTO: 0.2 %
BILIRUB SERPL-MCNC: 0.9 MG/DL
BUN SERPL-MCNC: 16 MG/DL
CALCIUM SERPL-MCNC: 9 MG/DL
CHLORIDE SERPL-SCNC: 105 MMOL/L
CHOLEST SERPL-MCNC: 126 MG/DL
CO2 SERPL-SCNC: 28 MMOL/L
CREAT SERPL-MCNC: 1.1 MG/DL
EOSINOPHIL # BLD AUTO: 0.11 K/UL
EOSINOPHIL NFR BLD AUTO: 2.4 %
ESTIMATED AVERAGE GLUCOSE: 111 MG/DL
GLUCOSE SERPL-MCNC: 97 MG/DL
HBA1C MFR BLD HPLC: 5.5 %
HCT VFR BLD CALC: 44.6 %
HDLC SERPL-MCNC: 37 MG/DL
HGB BLD-MCNC: 14.7 G/DL
IMM GRANULOCYTES NFR BLD AUTO: 0.2 %
LDLC SERPL CALC-MCNC: 73 MG/DL
LYMPHOCYTES # BLD AUTO: 1.7 K/UL
LYMPHOCYTES NFR BLD AUTO: 37.3 %
MAN DIFF?: NORMAL
MCHC RBC-ENTMCNC: 30.4 PG
MCHC RBC-ENTMCNC: 33 G/DL
MCV RBC AUTO: 92.3 FL
MONOCYTES # BLD AUTO: 0.38 K/UL
MONOCYTES NFR BLD AUTO: 8.3 %
NEUTROPHILS # BLD AUTO: 2.35 K/UL
NEUTROPHILS NFR BLD AUTO: 51.6 %
NONHDLC SERPL-MCNC: 89 MG/DL
PLATELET # BLD AUTO: 156 K/UL
POTASSIUM SERPL-SCNC: 4.7 MMOL/L
PROT SERPL-MCNC: 6.6 G/DL
RBC # BLD: 4.83 M/UL
RBC # FLD: 13.7 %
SODIUM SERPL-SCNC: 143 MMOL/L
TRIGL SERPL-MCNC: 102 MG/DL
WBC # FLD AUTO: 4.56 K/UL

## 2021-05-06 ENCOUNTER — APPOINTMENT (OUTPATIENT)
Dept: CARDIOLOGY | Facility: CLINIC | Age: 61
End: 2021-05-06
Payer: COMMERCIAL

## 2021-05-06 VITALS
SYSTOLIC BLOOD PRESSURE: 118 MMHG | BODY MASS INDEX: 36.4 KG/M2 | DIASTOLIC BLOOD PRESSURE: 82 MMHG | WEIGHT: 260 LBS | HEART RATE: 70 BPM | HEIGHT: 71 IN | TEMPERATURE: 96.1 F

## 2021-05-06 PROCEDURE — 99214 OFFICE O/P EST MOD 30 MIN: CPT

## 2021-05-06 PROCEDURE — 93000 ELECTROCARDIOGRAM COMPLETE: CPT

## 2021-05-06 PROCEDURE — 99072 ADDL SUPL MATRL&STAF TM PHE: CPT

## 2021-05-06 NOTE — DATA REVIEWED
[FreeTextEntry1] :  Biopsy             Final\par \par \par   STEFANIE POTTS                        \par \par                 Surgical Final Report\par \par \par         Final Diagnosis\par           1. Left neck, pigmented lesion, excision:\par            - Seborrheic keratosis.\par \par           2. Anterior neck, pigmented lesion, excision:\par            - Seborrheic keratosis.\par \par           3. Skin tags, left neck, right axilla x 2, right groin and left\par           lateral canthus, excision:\par            - Two polypoid intradermal melanocytic nevi, skin tags, and\par           polypoid seborrheic keratosis.\par \par           Verified by: Marina Avalos M.D.\par           (Electronic Signature)\par           Reported on: 02/21/19 08:36 EST, [facility]           [place] [address]\par           _________________________________________________________________\par \par           Clinical History\par           Excision of left neck pigmented lesion, excision of anterior neck\par           pigmented lesion, excision of left neck, right axilla x2, right\par           groin, left lateral canthus skin tags\par \par           Specimen(s) Submitted\par           1     Left neck pigmented lesion\par           2     Anterior neck pigmented lesion\par           3     Left neck, Right axilla, Right groin, Left lateral canthus\par           skin tags\par \par           Gross Description\par           1. The specimen is received in formalin, labeled "left neck\par           pigmented lesion" and consists of an unoriented ellipse of tan\par           skin measuring 1.5 x 0.7 x 0.3 cm On the skin surface there is\par           one raised grey lesion measuring 0.9 x 0.7 cm, < 0.1 cm from the\par           nearest margin. The base of the specimen is inked black. The\par           specimen is submitted entirely.\par \par           Summary of Sections:\par \par           1A - Tips, shaved - 1\par           1B - Remaining serially sectioned  specimen - 1\par \par           Total blocks: 2\par \par           2. The specimen is received in formalin, and labeled "anterior\par           neck pigmented lesion" and consists of an unoriented ellipse of\par           tan skin measuring 0.7 x 0.3 x 0.3 cm. On the skin surface, there\par           is a raised grey lesion measuring 0.4 x 0.3\par \par \par \par \par \par           STEFANIE POTTS                        2\par \par \par \par                  Surgical Final Report\par \par \par \par \par           cm. It is < 0.1 cm from the nearest margin. The base of the\par           specimen is inked black, the specimen is trisected. The specimen\par           is submitted entirely. (1 block)\par \par           3. The specimen is received in formalin, and labeled "left\par           lateral canthus skin tag" and consists of six polypoid fragments\par           of tan skin, measuring 0.2 x 0.1 x 0.1 cm through 1 x 0.7 x 0.5\par           cm. The bases of the two largest fragments are inked black, both\par           are bisected. The specimen is submitted entirely. (2 blocks)\par \par           Specimen was received and underwent gross examination at [facility], [address] [place] [address],\[place] [address].\par \par           02/18/19 13:54 ns\par \par           Perioperative Diagnosis\par           D48.5 - Neoplasm of uncertain behavior of skin\par \par  \par \par  Ordered by: ROSEY CHINO IV       Collected/Examined: [id number] 12:36PM       \par Verification Required       Stage: Final       \par  Performed at: [facility] (Med Director: Wilner Clay M.D)       Resulted: [id number] 08:36AM       Last Updated: 21Feb2019 08:36AM       Accession: [id number]

## 2021-05-06 NOTE — PHYSICAL EXAM
[General Appearance - Well Developed] : well developed [Normal Appearance] : normal appearance [Well Groomed] : well groomed [General Appearance - Well Nourished] : well nourished [No Deformities] : no deformities [General Appearance - In No Acute Distress] : no acute distress [Normal Conjunctiva] : the conjunctiva exhibited no abnormalities [Eyelids - No Xanthelasma] : the eyelids demonstrated no xanthelasmas [Normal Oral Mucosa] : normal oral mucosa [No Oral Pallor] : no oral pallor [No Oral Cyanosis] : no oral cyanosis [Respiration, Rhythm And Depth] : normal respiratory rhythm and effort [Exaggerated Use Of Accessory Muscles For Inspiration] : no accessory muscle use [Auscultation Breath Sounds / Voice Sounds] : lungs were clear to auscultation bilaterally [Abdomen Soft] : soft [Abdomen Tenderness] : non-tender [Abdomen Mass (___ Cm)] : no abdominal mass palpated [Abnormal Walk] : normal gait [Gait - Sufficient For Exercise Testing] : the gait was sufficient for exercise testing [Nail Clubbing] : no clubbing of the fingernails [Cyanosis, Localized] : no localized cyanosis [Petechial Hemorrhages (___cm)] : no petechial hemorrhages [Skin Color & Pigmentation] : normal skin color and pigmentation [] : no rash [No Venous Stasis] : no venous stasis [Skin Lesions] : no skin lesions [No Skin Ulcers] : no skin ulcer [No Xanthoma] : no  xanthoma was observed [Oriented To Time, Place, And Person] : oriented to person, place, and time [Affect] : the affect was normal [Mood] : the mood was normal [No Anxiety] : not feeling anxious [Normal Rate] : normal [Rhythm Regular] : regular [1+] : left 1+ [FreeTextEntry1] : No JVD  [de-identified] : well-appearing male, NAD [de-identified] : right neck incisions clean and dry, no evidence of cellulitis or fluid collection, slight dog ear deformity, left neck shave site c/d/i [de-identified] : right axilla, left neck, right groin and left lateral canthal shave sites healing appropriately, c/d/i, no evidence of cellulitis  [Rt] : no varicose veins of the right leg [Lt] : no varicose veins of the left leg

## 2021-05-06 NOTE — HISTORY OF PRESENT ILLNESS
[FreeTextEntry1] : The patient has not had significant CP since last visit. He GI symptoms have improved. Not smoking . No symptoms when riding his bike or exercising . Has had atypical symptoms which are fleeting .

## 2021-05-06 NOTE — ASSESSMENT
[FreeTextEntry1] : The patient has distal LAD disease which is significant but the segment is too small for intervention. RCA and LCX stents are patent at last cath . Mid LAD lesion is not significant by FFR .  No ECG changes.  . ETT Echo was negative for ischemia at high exercise load in 6-18 and  . . No chest with over 9 minutes of exercise. His symptoms are not similar to the symptoms he had prior to the interventions and these are improved. . LDL is at goal He has lost some weight . He has stopped smoking . The has erctile dysfunction and was very clearly told that if takes and forms of NTG either under his tongue or long acting nitrates . I have clearly told him that if he take erectile dysfunction medicaion with any forms of nitrates he can Die . He fully understands this . \par

## 2021-05-06 NOTE — REASON FOR VISIT
[Follow-Up - Clinic] : a clinic follow-up of [Coronary Artery Disease] : coronary artery disease [Hyperlipidemia] : hyperlipidemia [Hypertension] : hypertension [Post Op: _________] : a [unfilled] post op visit [FreeTextEntry3] : Dr. Delcid

## 2021-09-16 LAB
ALBUMIN SERPL ELPH-MCNC: 4.4 G/DL
ALP BLD-CCNC: 60 U/L
ALT SERPL-CCNC: 20 U/L
ANION GAP SERPL CALC-SCNC: 13 MMOL/L
AST SERPL-CCNC: 21 U/L
BASOPHILS # BLD AUTO: 0.01 K/UL
BASOPHILS NFR BLD AUTO: 0.2 %
BILIRUB SERPL-MCNC: 0.9 MG/DL
BUN SERPL-MCNC: 21 MG/DL
CALCIUM SERPL-MCNC: 9.1 MG/DL
CHLORIDE SERPL-SCNC: 103 MMOL/L
CHOLEST SERPL-MCNC: 121 MG/DL
CO2 SERPL-SCNC: 27 MMOL/L
CREAT SERPL-MCNC: 1.1 MG/DL
EOSINOPHIL # BLD AUTO: 0.15 K/UL
EOSINOPHIL NFR BLD AUTO: 3.3 %
GLUCOSE SERPL-MCNC: 101 MG/DL
HCT VFR BLD CALC: 44 %
HDLC SERPL-MCNC: 37 MG/DL
HGB BLD-MCNC: 14.2 G/DL
IMM GRANULOCYTES NFR BLD AUTO: 0.2 %
LDLC SERPL CALC-MCNC: 70 MG/DL
LYMPHOCYTES # BLD AUTO: 1.63 K/UL
LYMPHOCYTES NFR BLD AUTO: 36.1 %
MAN DIFF?: NORMAL
MCHC RBC-ENTMCNC: 30 PG
MCHC RBC-ENTMCNC: 32.3 G/DL
MCV RBC AUTO: 92.8 FL
MONOCYTES # BLD AUTO: 0.43 K/UL
MONOCYTES NFR BLD AUTO: 9.5 %
NEUTROPHILS # BLD AUTO: 2.29 K/UL
NEUTROPHILS NFR BLD AUTO: 50.7 %
NONHDLC SERPL-MCNC: 84 MG/DL
PLATELET # BLD AUTO: 152 K/UL
POTASSIUM SERPL-SCNC: 4.1 MMOL/L
PROT SERPL-MCNC: 6.4 G/DL
RBC # BLD: 4.74 M/UL
RBC # FLD: 13.8 %
SODIUM SERPL-SCNC: 143 MMOL/L
TRIGL SERPL-MCNC: 109 MG/DL
WBC # FLD AUTO: 4.52 K/UL

## 2021-09-20 ENCOUNTER — APPOINTMENT (OUTPATIENT)
Dept: CARDIOLOGY | Facility: CLINIC | Age: 61
End: 2021-09-20
Payer: COMMERCIAL

## 2021-09-20 VITALS
DIASTOLIC BLOOD PRESSURE: 72 MMHG | WEIGHT: 259 LBS | HEART RATE: 61 BPM | HEIGHT: 71 IN | TEMPERATURE: 96.8 F | SYSTOLIC BLOOD PRESSURE: 128 MMHG | BODY MASS INDEX: 36.26 KG/M2

## 2021-09-20 PROCEDURE — 99214 OFFICE O/P EST MOD 30 MIN: CPT

## 2021-09-20 PROCEDURE — 93000 ELECTROCARDIOGRAM COMPLETE: CPT

## 2021-09-20 NOTE — HISTORY OF PRESENT ILLNESS
[FreeTextEntry1] : The patient has not had significant CP since last visit. He GI symptoms have improved but does belch when eatng the correct foods . Not smoking . No symptoms when riding his bike or exercising .

## 2021-09-20 NOTE — DATA REVIEWED
[FreeTextEntry1] :  Biopsy             Final\par \par \par   STEFANIE POTTS                        \par \par                 Surgical Final Report\par \par \par         Final Diagnosis\par           1. Left neck, pigmented lesion, excision:\par            - Seborrheic keratosis.\par \par           2. Anterior neck, pigmented lesion, excision:\par            - Seborrheic keratosis.\par \par           3. Skin tags, left neck, right axilla x 2, right groin and left\par           lateral canthus, excision:\par            - Two polypoid intradermal melanocytic nevi, skin tags, and\par           polypoid seborrheic keratosis.\par \par           Verified by: Marina Avalos M.D.\par           (Electronic Signature)\par           Reported on: 02/21/19 08:36 EST, [facility],\[place]           [place] [address]\par           _________________________________________________________________\par \par           Clinical History\par           Excision of left neck pigmented lesion, excision of anterior neck\par           pigmented lesion, excision of left neck, right axilla x2, right\par           groin, left lateral canthus skin tags\par \par           Specimen(s) Submitted\par           1     Left neck pigmented lesion\par           2     Anterior neck pigmented lesion\par           3     Left neck, Right axilla, Right groin, Left lateral canthus\par           skin tags\par \par           Gross Description\par           1. The specimen is received in formalin, labeled "left neck\par           pigmented lesion" and consists of an unoriented ellipse of tan\par           skin measuring 1.5 x 0.7 x 0.3 cm On the skin surface there is\par           one raised grey lesion measuring 0.9 x 0.7 cm, < 0.1 cm from the\par           nearest margin. The base of the specimen is inked black. The\par           specimen is submitted entirely.\par \par           Summary of Sections:\par \par           1A - Tips, shaved - 1\par           1B - Remaining serially sectioned  specimen - 1\par \par           Total blocks: 2\par \par           2. The specimen is received in formalin, and labeled "anterior\par           neck pigmented lesion" and consists of an unoriented ellipse of\par           tan skin measuring 0.7 x 0.3 x 0.3 cm. On the skin surface, there\par           is a raised grey lesion measuring 0.4 x 0.3\par \par \par \par \par \par           STEFANIE POTTS                        2\par \par \par \par                  Surgical Final Report\par \par \par \par \par           cm. It is < 0.1 cm from the nearest margin. The base of the\par           specimen is inked black, the specimen is trisected. The specimen\par           is submitted entirely. (1 block)\par \par           3. The specimen is received in formalin, and labeled "left\par           lateral canthus skin tag" and consists of six polypoid fragments\par           of tan skin, measuring 0.2 x 0.1 x 0.1 cm through 1 x 0.7 x 0.5\par           cm. The bases of the two largest fragments are inked black, both\par           are bisected. The specimen is submitted entirely. (2 blocks)\par \par           Specimen was received and underwent gross examination at [facility], [address] [place] [address],\[place] [address].\par \par           02/18/19 13:54 ns\par \par           Perioperative Diagnosis\par           D48.5 - Neoplasm of uncertain behavior of skin\par \par  \par \par  Ordered by: ROSEY CHINO IV       Collected/Examined: [id number] 12:36PM       \par Verification Required       Stage: Final       \par  Performed at: [facility] (Med Director: Wilner Clay M.D)       Resulted: [id number] 08:36AM       Last Updated: 21Feb2019 08:36AM       Accession: [id number]

## 2021-09-20 NOTE — ASSESSMENT
[FreeTextEntry1] : The patient has distal LAD disease which is significant but the segment is too small for intervention. RCA and LCX stents are patent at last cath . Mid LAD lesion is not significant by FFR .  No ECG changes.  . ETT Echo was negative for ischemia at high exercise load in 6-18 and  . . No chest with over 9 minutes of exercise. His symptoms are not similar to the symptoms he had prior to the interventions and these are improved. . \par

## 2021-09-20 NOTE — PHYSICAL EXAM
[General Appearance - Well Developed] : well developed [Normal Appearance] : normal appearance [Well Groomed] : well groomed [General Appearance - Well Nourished] : well nourished [No Deformities] : no deformities [General Appearance - In No Acute Distress] : no acute distress [Normal Conjunctiva] : the conjunctiva exhibited no abnormalities [Eyelids - No Xanthelasma] : the eyelids demonstrated no xanthelasmas [Normal Oral Mucosa] : normal oral mucosa [No Oral Pallor] : no oral pallor [No Oral Cyanosis] : no oral cyanosis [Respiration, Rhythm And Depth] : normal respiratory rhythm and effort [Exaggerated Use Of Accessory Muscles For Inspiration] : no accessory muscle use [Auscultation Breath Sounds / Voice Sounds] : lungs were clear to auscultation bilaterally [Abdomen Soft] : soft [Abdomen Tenderness] : non-tender [Abdomen Mass (___ Cm)] : no abdominal mass palpated [Abnormal Walk] : normal gait [Gait - Sufficient For Exercise Testing] : the gait was sufficient for exercise testing [Nail Clubbing] : no clubbing of the fingernails [Cyanosis, Localized] : no localized cyanosis [Petechial Hemorrhages (___cm)] : no petechial hemorrhages [Skin Color & Pigmentation] : normal skin color and pigmentation [] : no rash [No Venous Stasis] : no venous stasis [Skin Lesions] : no skin lesions [No Skin Ulcers] : no skin ulcer [No Xanthoma] : no  xanthoma was observed [Oriented To Time, Place, And Person] : oriented to person, place, and time [Affect] : the affect was normal [Mood] : the mood was normal [No Anxiety] : not feeling anxious [Normal Rate] : normal [Rhythm Regular] : regular [1+] : left 1+ [FreeTextEntry1] : No JVD  [de-identified] : well-appearing male, NAD [de-identified] : right neck incisions clean and dry, no evidence of cellulitis or fluid collection, slight dog ear deformity, left neck shave site c/d/i [de-identified] : right axilla, left neck, right groin and left lateral canthal shave sites healing appropriately, c/d/i, no evidence of cellulitis  [Rt] : no varicose veins of the right leg [Lt] : no varicose veins of the left leg

## 2022-03-20 LAB
ALBUMIN SERPL ELPH-MCNC: 4.2 G/DL
ALP BLD-CCNC: 58 U/L
ALT SERPL-CCNC: 21 U/L
ANION GAP SERPL CALC-SCNC: 12 MMOL/L
AST SERPL-CCNC: 23 U/L
BASOPHILS # BLD AUTO: 0.01 K/UL
BASOPHILS NFR BLD AUTO: 0.2 %
BILIRUB SERPL-MCNC: 0.7 MG/DL
BUN SERPL-MCNC: 20 MG/DL
CALCIUM SERPL-MCNC: 9.3 MG/DL
CHLORIDE SERPL-SCNC: 105 MMOL/L
CHOLEST SERPL-MCNC: 119 MG/DL
CO2 SERPL-SCNC: 27 MMOL/L
CREAT SERPL-MCNC: 1.2 MG/DL
EGFR: 68 ML/MIN/1.73M2
EOSINOPHIL # BLD AUTO: 0.12 K/UL
EOSINOPHIL NFR BLD AUTO: 2.3 %
ESTIMATED AVERAGE GLUCOSE: 108 MG/DL
GLUCOSE SERPL-MCNC: 95 MG/DL
HBA1C MFR BLD HPLC: 5.4 %
HCT VFR BLD CALC: 45.2 %
HDLC SERPL-MCNC: 38 MG/DL
HGB BLD-MCNC: 14.3 G/DL
IMM GRANULOCYTES NFR BLD AUTO: 0.2 %
LDLC SERPL CALC-MCNC: 68 MG/DL
LYMPHOCYTES # BLD AUTO: 2.09 K/UL
LYMPHOCYTES NFR BLD AUTO: 40.8 %
MAN DIFF?: NORMAL
MCHC RBC-ENTMCNC: 29.5 PG
MCHC RBC-ENTMCNC: 31.6 G/DL
MCV RBC AUTO: 93.2 FL
MONOCYTES # BLD AUTO: 0.41 K/UL
MONOCYTES NFR BLD AUTO: 8 %
NEUTROPHILS # BLD AUTO: 2.48 K/UL
NEUTROPHILS NFR BLD AUTO: 48.5 %
NONHDLC SERPL-MCNC: 81 MG/DL
PLATELET # BLD AUTO: 170 K/UL
POTASSIUM SERPL-SCNC: 4.3 MMOL/L
PROT SERPL-MCNC: 6.6 G/DL
RBC # BLD: 4.85 M/UL
RBC # FLD: 13.8 %
SODIUM SERPL-SCNC: 144 MMOL/L
TRIGL SERPL-MCNC: 98 MG/DL
WBC # FLD AUTO: 5.12 K/UL

## 2022-03-24 ENCOUNTER — APPOINTMENT (OUTPATIENT)
Dept: CARDIOLOGY | Facility: CLINIC | Age: 62
End: 2022-03-24
Payer: COMMERCIAL

## 2022-03-24 VITALS
SYSTOLIC BLOOD PRESSURE: 110 MMHG | HEIGHT: 71 IN | DIASTOLIC BLOOD PRESSURE: 80 MMHG | BODY MASS INDEX: 36.12 KG/M2 | HEART RATE: 78 BPM | WEIGHT: 258 LBS

## 2022-03-24 PROCEDURE — 99214 OFFICE O/P EST MOD 30 MIN: CPT

## 2022-03-24 PROCEDURE — 93000 ELECTROCARDIOGRAM COMPLETE: CPT

## 2022-03-24 NOTE — ASSESSMENT
[FreeTextEntry1] : The patient has distal LAD disease which is significant but the segment is too small for intervention. RCA and LCX stents are patent at last cath . Mid LAD lesion is not significant by FFR .  No ECG changes.  . ETT Echo was negative for ischemia at high exercise load in 6-18 and  . . No chest with over 9 minutes of exercise. His symptoms are not similar to the symptoms he had prior to the interventions and these are improved. . The patient has been feeling well since last visit. He has injured his right ankle during a chiropractic procedure. No edema . ? sprain advised Podiatry \par

## 2022-03-24 NOTE — HISTORY OF PRESENT ILLNESS
[FreeTextEntry1] : The patient has 3 vessel CAD with stents in RCA and LCX . LAD has distal disease in a small segment of the vessel and is not amendable to intervention . He has been stable with this and has not had obvious angina . The patient has some discomfort only after eating certain foods . Known GERD as well. Ischemia work has been negative

## 2022-03-24 NOTE — DATA REVIEWED
[FreeTextEntry1] :  Biopsy             Final\par \par \par   STEFANIE POTTS                        \par \par                 Surgical Final Report\par \par \par         Final Diagnosis\par           1. Left neck, pigmented lesion, excision:\par            - Seborrheic keratosis.\par \par           2. Anterior neck, pigmented lesion, excision:\par            - Seborrheic keratosis.\par \par           3. Skin tags, left neck, right axilla x 2, right groin and left\par           lateral canthus, excision:\par            - Two polypoid intradermal melanocytic nevi, skin tags, and\par           polypoid seborrheic keratosis.\par \par           Verified by: Marina Avalos M.D.\par           (Electronic Signature)\par           Reported on: 02/21/19 08:36 EST, [facility]           [place] [address]\par           _________________________________________________________________\par \par           Clinical History\par           Excision of left neck pigmented lesion, excision of anterior neck\par           pigmented lesion, excision of left neck, right axilla x2, right\par           groin, left lateral canthus skin tags\par \par           Specimen(s) Submitted\par           1     Left neck pigmented lesion\par           2     Anterior neck pigmented lesion\par           3     Left neck, Right axilla, Right groin, Left lateral canthus\par           skin tags\par \par           Gross Description\par           1. The specimen is received in formalin, labeled "left neck\par           pigmented lesion" and consists of an unoriented ellipse of tan\par           skin measuring 1.5 x 0.7 x 0.3 cm On the skin surface there is\par           one raised grey lesion measuring 0.9 x 0.7 cm, < 0.1 cm from the\par           nearest margin. The base of the specimen is inked black. The\par           specimen is submitted entirely.\par \par           Summary of Sections:\par \par           1A - Tips, shaved - 1\par           1B - Remaining serially sectioned  specimen - 1\par \par           Total blocks: 2\par \par           2. The specimen is received in formalin, and labeled "anterior\par           neck pigmented lesion" and consists of an unoriented ellipse of\par           tan skin measuring 0.7 x 0.3 x 0.3 cm. On the skin surface, there\par           is a raised grey lesion measuring 0.4 x 0.3\par \par \par \par \par \par           STEFANIE POTTS                        2\par \par \par \par                  Surgical Final Report\par \par \par \par \par           cm. It is < 0.1 cm from the nearest margin. The base of the\par           specimen is inked black, the specimen is trisected. The specimen\par           is submitted entirely. (1 block)\par \par           3. The specimen is received in formalin, and labeled "left\par           lateral canthus skin tag" and consists of six polypoid fragments\par           of tan skin, measuring 0.2 x 0.1 x 0.1 cm through 1 x 0.7 x 0.5\par           cm. The bases of the two largest fragments are inked black, both\par           are bisected. The specimen is submitted entirely. (2 blocks)\par \par           Specimen was received and underwent gross examination at [facility], [address] [place] [address],\[place] [address].\par \par           02/18/19 13:54 ns\par \par           Perioperative Diagnosis\par           D48.5 - Neoplasm of uncertain behavior of skin\par \par  \par \par  Ordered by: ROSEY CHINO IV       Collected/Examined: [id number] 12:36PM       \par Verification Required       Stage: Final       \par  Performed at: [facility] (Med Director: Wilner Clay M.D)       Resulted: [id number] 08:36AM       Last Updated: 21Feb2019 08:36AM       Accession: [id number]

## 2022-03-24 NOTE — PHYSICAL EXAM
[General Appearance - Well Developed] : well developed [Normal Appearance] : normal appearance [Well Groomed] : well groomed [General Appearance - Well Nourished] : well nourished [No Deformities] : no deformities [General Appearance - In No Acute Distress] : no acute distress [Normal Conjunctiva] : the conjunctiva exhibited no abnormalities [Eyelids - No Xanthelasma] : the eyelids demonstrated no xanthelasmas [Normal Oral Mucosa] : normal oral mucosa [No Oral Pallor] : no oral pallor [No Oral Cyanosis] : no oral cyanosis [Respiration, Rhythm And Depth] : normal respiratory rhythm and effort [Exaggerated Use Of Accessory Muscles For Inspiration] : no accessory muscle use [Auscultation Breath Sounds / Voice Sounds] : lungs were clear to auscultation bilaterally [Abdomen Soft] : soft [Abdomen Tenderness] : non-tender [Abdomen Mass (___ Cm)] : no abdominal mass palpated [Abnormal Walk] : normal gait [Gait - Sufficient For Exercise Testing] : the gait was sufficient for exercise testing [Nail Clubbing] : no clubbing of the fingernails [Cyanosis, Localized] : no localized cyanosis [Petechial Hemorrhages (___cm)] : no petechial hemorrhages [Skin Color & Pigmentation] : normal skin color and pigmentation [] : no rash [No Venous Stasis] : no venous stasis [Skin Lesions] : no skin lesions [No Skin Ulcers] : no skin ulcer [No Xanthoma] : no  xanthoma was observed [Affect] : the affect was normal [Oriented To Time, Place, And Person] : oriented to person, place, and time [Mood] : the mood was normal [No Anxiety] : not feeling anxious [Normal Rate] : normal [Rhythm Regular] : regular [1+] : left 1+ [FreeTextEntry1] : No JVD  [de-identified] : well-appearing male, NAD [de-identified] : right neck incisions clean and dry, no evidence of cellulitis or fluid collection, slight dog ear deformity, left neck shave site c/d/i [de-identified] : right axilla, left neck, right groin and left lateral canthal shave sites healing appropriately, c/d/i, no evidence of cellulitis  [Rt] : no varicose veins of the right leg [Lt] : no varicose veins of the left leg

## 2022-04-06 ENCOUNTER — RESULT REVIEW (OUTPATIENT)
Age: 62
End: 2022-04-06

## 2022-10-27 ENCOUNTER — APPOINTMENT (OUTPATIENT)
Dept: CARDIOLOGY | Facility: CLINIC | Age: 62
End: 2022-10-27

## 2022-10-27 PROCEDURE — 93320 DOPPLER ECHO COMPLETE: CPT

## 2022-10-27 PROCEDURE — 93325 DOPPLER ECHO COLOR FLOW MAPG: CPT

## 2022-10-27 PROCEDURE — 93351 STRESS TTE COMPLETE: CPT

## 2022-11-10 ENCOUNTER — APPOINTMENT (OUTPATIENT)
Dept: CARDIOLOGY | Facility: CLINIC | Age: 62
End: 2022-11-10

## 2022-11-10 VITALS
DIASTOLIC BLOOD PRESSURE: 82 MMHG | HEART RATE: 68 BPM | HEIGHT: 71 IN | WEIGHT: 253 LBS | SYSTOLIC BLOOD PRESSURE: 122 MMHG | TEMPERATURE: 97.9 F | BODY MASS INDEX: 35.42 KG/M2 | OXYGEN SATURATION: 98 %

## 2022-11-10 DIAGNOSIS — K21.00 GASTRO-ESOPHAGEAL REFLUX DISEASE WITH ESOPHAGITIS, WITHOUT BLEEDING: ICD-10-CM

## 2022-11-10 PROCEDURE — 99214 OFFICE O/P EST MOD 30 MIN: CPT | Mod: 25

## 2022-11-10 PROCEDURE — 93000 ELECTROCARDIOGRAM COMPLETE: CPT

## 2022-11-10 NOTE — HISTORY OF PRESENT ILLNESS
[FreeTextEntry1] : The patient has 3 vessel CAD with stents in RCA and LCX . LAD has distal disease in a small segment of the vessel and is not amendable to intervention . He has been stable with this and has not had obvious angina . The patient has some discomfort only after eating certain foods . Known GERD as well. Ischemia work has been negative . ETT Echo was negative for ischemia at high level.

## 2022-11-10 NOTE — CARDIOLOGY SUMMARY
[___] : [unfilled] [de-identified] : NSR Normal ECG 5-7-2021 \par 3- NSR Normal ECG . \par 11- NSR Normal ECG .  [de-identified] : [id number] ETT echo as negative for ischemia on echo images at high exercise load . Completed stage III Mil AI Mild MR and TR

## 2022-11-10 NOTE — ASSESSMENT
[FreeTextEntry1] : The patient has distal LAD disease which is significant but the segment is too small for intervention. RCA and LCX stents are patent at last cath . Mid LAD lesion is not significant by FFR .  No ECG changes.  . ETT Echo was negative for ischemia at high exercise load in 6-18 and  and    . . No chest with over 9 minutes of exercise. LDL is in the 80's on max dose statins . \par

## 2022-11-10 NOTE — PHYSICAL EXAM
[General Appearance - Well Developed] : well developed [Normal Appearance] : normal appearance [Well Groomed] : well groomed [General Appearance - Well Nourished] : well nourished [No Deformities] : no deformities [General Appearance - In No Acute Distress] : no acute distress [Normal Conjunctiva] : the conjunctiva exhibited no abnormalities [Eyelids - No Xanthelasma] : the eyelids demonstrated no xanthelasmas [Normal Oral Mucosa] : normal oral mucosa [No Oral Pallor] : no oral pallor [No Oral Cyanosis] : no oral cyanosis [Respiration, Rhythm And Depth] : normal respiratory rhythm and effort [Exaggerated Use Of Accessory Muscles For Inspiration] : no accessory muscle use [Auscultation Breath Sounds / Voice Sounds] : lungs were clear to auscultation bilaterally [Abdomen Soft] : soft [Abdomen Tenderness] : non-tender [Abdomen Mass (___ Cm)] : no abdominal mass palpated [Abnormal Walk] : normal gait [Gait - Sufficient For Exercise Testing] : the gait was sufficient for exercise testing [Nail Clubbing] : no clubbing of the fingernails [Cyanosis, Localized] : no localized cyanosis [Petechial Hemorrhages (___cm)] : no petechial hemorrhages [Skin Color & Pigmentation] : normal skin color and pigmentation [] : no rash [No Venous Stasis] : no venous stasis [Skin Lesions] : no skin lesions [No Skin Ulcers] : no skin ulcer [No Xanthoma] : no  xanthoma was observed [Oriented To Time, Place, And Person] : oriented to person, place, and time [Affect] : the affect was normal [Mood] : the mood was normal [No Anxiety] : not feeling anxious [Normal Rate] : normal [Rhythm Regular] : regular [1+] : left 1+ [FreeTextEntry1] : No JVD  [de-identified] : well-appearing male, NAD [de-identified] : right neck incisions clean and dry, no evidence of cellulitis or fluid collection, slight dog ear deformity, left neck shave site c/d/i [de-identified] : right axilla, left neck, right groin and left lateral canthal shave sites healing appropriately, c/d/i, no evidence of cellulitis  [Rt] : no varicose veins of the right leg [Lt] : no varicose veins of the left leg

## 2022-11-10 NOTE — DATA REVIEWED
[FreeTextEntry1] :  Biopsy             Final\par \par \par   STEFANIE POTTS                        \par \par                 Surgical Final Report\par \par \par         Final Diagnosis\par           1. Left neck, pigmented lesion, excision:\par            - Seborrheic keratosis.\par \par           2. Anterior neck, pigmented lesion, excision:\par            - Seborrheic keratosis.\par \par           3. Skin tags, left neck, right axilla x 2, right groin and left\par           lateral canthus, excision:\par            - Two polypoid intradermal melanocytic nevi, skin tags, and\par           polypoid seborrheic keratosis.\par \par           Verified by: Marina Avalos M.D.\par           (Electronic Signature)\par           Reported on: 02/21/19 08:36 EST, [facility]           [place] [address]\par           _________________________________________________________________\par \par           Clinical History\par           Excision of left neck pigmented lesion, excision of anterior neck\par           pigmented lesion, excision of left neck, right axilla x2, right\par           groin, left lateral canthus skin tags\par \par           Specimen(s) Submitted\par           1     Left neck pigmented lesion\par           2     Anterior neck pigmented lesion\par           3     Left neck, Right axilla, Right groin, Left lateral canthus\par           skin tags\par \par           Gross Description\par           1. The specimen is received in formalin, labeled "left neck\par           pigmented lesion" and consists of an unoriented ellipse of tan\par           skin measuring 1.5 x 0.7 x 0.3 cm On the skin surface there is\par           one raised grey lesion measuring 0.9 x 0.7 cm, < 0.1 cm from the\par           nearest margin. The base of the specimen is inked black. The\par           specimen is submitted entirely.\par \par           Summary of Sections:\par \par           1A - Tips, shaved - 1\par           1B - Remaining serially sectioned  specimen - 1\par \par           Total blocks: 2\par \par           2. The specimen is received in formalin, and labeled "anterior\par           neck pigmented lesion" and consists of an unoriented ellipse of\par           tan skin measuring 0.7 x 0.3 x 0.3 cm. On the skin surface, there\par           is a raised grey lesion measuring 0.4 x 0.3\par \par \par \par \par \par           STEFANIE POTTS                        2\par \par \par \par                  Surgical Final Report\par \par \par \par \par           cm. It is < 0.1 cm from the nearest margin. The base of the\par           specimen is inked black, the specimen is trisected. The specimen\par           is submitted entirely. (1 block)\par \par           3. The specimen is received in formalin, and labeled "left\par           lateral canthus skin tag" and consists of six polypoid fragments\par           of tan skin, measuring 0.2 x 0.1 x 0.1 cm through 1 x 0.7 x 0.5\par           cm. The bases of the two largest fragments are inked black, both\par           are bisected. The specimen is submitted entirely. (2 blocks)\par \par           Specimen was received and underwent gross examination at [facility], [address] [place] [address],\[place] [address].\par \par           02/18/19 13:54 ns\par \par           Perioperative Diagnosis\par           D48.5 - Neoplasm of uncertain behavior of skin\par \par  \par \par  Ordered by: ROSEY CHINO IV       Collected/Examined: [id number] 12:36PM       \par Verification Required       Stage: Final       \par  Performed at: [facility] (Med Director: Wilner Clay M.D)       Resulted: [id number] 08:36AM       Last Updated: 21Feb2019 08:36AM       Accession: [id number]

## 2022-12-12 NOTE — CARDIOLOGY SUMMARY
[___] : [unfilled]
Quality 431: Preventive Care And Screening: Unhealthy Alcohol Use - Screening: Patient not identified as an unhealthy alcohol user when screened for unhealthy alcohol use using a systematic screening method
Quality 110: Preventive Care And Screening: Influenza Immunization: Influenza Immunization Administered during Influenza season
Detail Level: Detailed
Quality 402: Tobacco Use And Help With Quitting Among Adolescents: Patient screened for tobacco and never smoked

## 2023-01-26 NOTE — ASSESSMENT
[FreeTextEntry1] : The patient has distal LAD disease which is significant but the segment is too small for intervention. RCA and LCX stents are patent. Mid LAD lesion is not significant by FFR . Has chest pain only when upset. No ECG changes. The patient has had aggressive CAD  and has an area of the distal LAD which is not amenable to intervention . ETT Echo was negative for ischemia at high exercise load. No chest with over 9 minutes of exercise. His symtoms are not similar to the symptoms he had prior to the interventions. He has d stopped Ranexa on his own. There was no change in symptoms.  Olumiant Counseling: I discussed with the patient the risks of Olumiant therapy including but not limited to upper respiratory tract infections, shingles, cold sores, and nausea. Live vaccines should be avoided.  This medication has been linked to serious infections; higher rate of mortality; malignancy and lymphoproliferative disorders; major adverse cardiovascular events; thrombosis; gastrointestinal perforations; neutropenia; lymphopenia; anemia; liver enzyme elevations; and lipid elevations.

## 2023-05-13 LAB
ALBUMIN SERPL ELPH-MCNC: 4.2 G/DL
ALP BLD-CCNC: 63 U/L
ALT SERPL-CCNC: 20 U/L
ANION GAP SERPL CALC-SCNC: 11 MMOL/L
AST SERPL-CCNC: 23 U/L
BASOPHILS # BLD AUTO: 0.01 K/UL
BASOPHILS NFR BLD AUTO: 0.2 %
BILIRUB SERPL-MCNC: 0.5 MG/DL
BUN SERPL-MCNC: 22 MG/DL
CALCIUM SERPL-MCNC: 9.1 MG/DL
CHLORIDE SERPL-SCNC: 106 MMOL/L
CHOLEST SERPL-MCNC: 126 MG/DL
CO2 SERPL-SCNC: 26 MMOL/L
CREAT SERPL-MCNC: 1.1 MG/DL
EGFR: 75 ML/MIN/1.73M2
EOSINOPHIL # BLD AUTO: 0.1 K/UL
EOSINOPHIL NFR BLD AUTO: 2.2 %
ESTIMATED AVERAGE GLUCOSE: 111 MG/DL
GLUCOSE SERPL-MCNC: 111 MG/DL
HBA1C MFR BLD HPLC: 5.5 %
HCT VFR BLD CALC: 42.9 %
HDLC SERPL-MCNC: 42 MG/DL
HGB BLD-MCNC: 13.9 G/DL
IMM GRANULOCYTES NFR BLD AUTO: 0 %
LDLC SERPL CALC-MCNC: 64 MG/DL
LYMPHOCYTES # BLD AUTO: 1.93 K/UL
LYMPHOCYTES NFR BLD AUTO: 42.4 %
MAN DIFF?: NORMAL
MCHC RBC-ENTMCNC: 30.3 PG
MCHC RBC-ENTMCNC: 32.4 G/DL
MCV RBC AUTO: 93.7 FL
MONOCYTES # BLD AUTO: 0.33 K/UL
MONOCYTES NFR BLD AUTO: 7.3 %
NEUTROPHILS # BLD AUTO: 2.18 K/UL
NEUTROPHILS NFR BLD AUTO: 47.9 %
NONHDLC SERPL-MCNC: 84 MG/DL
PLATELET # BLD AUTO: 155 K/UL
POTASSIUM SERPL-SCNC: 4.5 MMOL/L
PROT SERPL-MCNC: 6.5 G/DL
RBC # BLD: 4.58 M/UL
RBC # FLD: 14.4 %
SODIUM SERPL-SCNC: 143 MMOL/L
TRIGL SERPL-MCNC: 98 MG/DL
WBC # FLD AUTO: 4.55 K/UL

## 2023-05-15 ENCOUNTER — APPOINTMENT (OUTPATIENT)
Dept: CARDIOLOGY | Facility: CLINIC | Age: 63
End: 2023-05-15
Payer: COMMERCIAL

## 2023-05-15 VITALS
WEIGHT: 252 LBS | SYSTOLIC BLOOD PRESSURE: 138 MMHG | HEIGHT: 71 IN | BODY MASS INDEX: 35.28 KG/M2 | DIASTOLIC BLOOD PRESSURE: 74 MMHG

## 2023-05-15 DIAGNOSIS — M25.569 PAIN IN UNSPECIFIED KNEE: ICD-10-CM

## 2023-05-15 PROCEDURE — 93000 ELECTROCARDIOGRAM COMPLETE: CPT

## 2023-05-15 PROCEDURE — 99214 OFFICE O/P EST MOD 30 MIN: CPT | Mod: 25

## 2023-05-15 NOTE — DATA REVIEWED
[FreeTextEntry1] :  Biopsy             Final\par \par \par   STEFANIE POTTS                        \par \par                 Surgical Final Report\par \par \par         Final Diagnosis\par           1. Left neck, pigmented lesion, excision:\par            - Seborrheic keratosis.\par \par           2. Anterior neck, pigmented lesion, excision:\par            - Seborrheic keratosis.\par \par           3. Skin tags, left neck, right axilla x 2, right groin and left\par           lateral canthus, excision:\par            - Two polypoid intradermal melanocytic nevi, skin tags, and\par           polypoid seborrheic keratosis.\par \par           Verified by: Marina Avalos M.D.\par           (Electronic Signature)\par           Reported on: 02/21/19 08:36 EST, [facility],\[place]           [place] [address]\par           _________________________________________________________________\par \par           Clinical History\par           Excision of left neck pigmented lesion, excision of anterior neck\par           pigmented lesion, excision of left neck, right axilla x2, right\par           groin, left lateral canthus skin tags\par \par           Specimen(s) Submitted\par           1     Left neck pigmented lesion\par           2     Anterior neck pigmented lesion\par           3     Left neck, Right axilla, Right groin, Left lateral canthus\par           skin tags\par \par           Gross Description\par           1. The specimen is received in formalin, labeled "left neck\par           pigmented lesion" and consists of an unoriented ellipse of tan\par           skin measuring 1.5 x 0.7 x 0.3 cm On the skin surface there is\par           one raised grey lesion measuring 0.9 x 0.7 cm, < 0.1 cm from the\par           nearest margin. The base of the specimen is inked black. The\par           specimen is submitted entirely.\par \par           Summary of Sections:\par \par           1A - Tips, shaved - 1\par           1B - Remaining serially sectioned  specimen - 1\par \par           Total blocks: 2\par \par           2. The specimen is received in formalin, and labeled "anterior\par           neck pigmented lesion" and consists of an unoriented ellipse of\par           tan skin measuring 0.7 x 0.3 x 0.3 cm. On the skin surface, there\par           is a raised grey lesion measuring 0.4 x 0.3\par \par \par \par \par \par           STEFANIE POTTS                        2\par \par \par \par                  Surgical Final Report\par \par \par \par \par           cm. It is < 0.1 cm from the nearest margin. The base of the\par           specimen is inked black, the specimen is trisected. The specimen\par           is submitted entirely. (1 block)\par \par           3. The specimen is received in formalin, and labeled "left\par           lateral canthus skin tag" and consists of six polypoid fragments\par           of tan skin, measuring 0.2 x 0.1 x 0.1 cm through 1 x 0.7 x 0.5\par           cm. The bases of the two largest fragments are inked black, both\par           are bisected. The specimen is submitted entirely. (2 blocks)\par \par           Specimen was received and underwent gross examination at [facility], [address] [place] [address],\[place] [address].\par \par           02/18/19 13:54 ns\par \par           Perioperative Diagnosis\par           D48.5 - Neoplasm of uncertain behavior of skin\par \par  \par \par  Ordered by: ROSEY CHINO IV       Collected/Examined: [id number] 12:36PM       \par Verification Required       Stage: Final       \par  Performed at: [facility] (Med Director: Wilner Clay M.D)       Resulted: [id number] 08:36AM       Last Updated: 21Feb2019 08:36AM       Accession: [id number]

## 2023-05-15 NOTE — CARDIOLOGY SUMMARY
[___] : [unfilled] [de-identified] : NSR Normal ECG 5-7-2021 \par 3- NSR Normal ECG . \par 11- NSR Normal ECG \par 5- NSR Normal ECG . .  [de-identified] : [id number] ETT echo as negative for ischemia on echo images at high exercise load . Completed stage III Mil AI Mild MR and TR

## 2023-05-15 NOTE — HISTORY OF PRESENT ILLNESS
[FreeTextEntry1] : The patient has 3 vessel CAD with stents in RCA and LCX . LAD has distal disease in a small segment of the vessel and is not amendable to intervention . He has been stable with this and has not had obvious angina . The patient has known GERD and his symptoms ni the past were hard to differentiate /.

## 2023-05-15 NOTE — ASSESSMENT
[FreeTextEntry1] : The patient has distal LAD disease which is significant but the segment is too small for intervention. RCA and LCX stents are patent at last cath . Mid LAD lesion is not significant by FFR .  No ECG changes.  . ETT Echo was negative for ischemia at high exercise load in 6-18 and  and    . . No chest with over 9 minutes of exercise. LDL is in the 80's on max dose statins . The patient was doing a low of painting at [employer] and had developed knww pain . This is worse at night compared to during the day . Pedal pules on right side is 2+ and on left is 1+  There is signs of venous insuffiency . \par

## 2023-05-15 NOTE — PHYSICAL EXAM
[General Appearance - Well Developed] : well developed [Normal Appearance] : normal appearance [Well Groomed] : well groomed [General Appearance - Well Nourished] : well nourished [No Deformities] : no deformities [General Appearance - In No Acute Distress] : no acute distress [Normal Conjunctiva] : the conjunctiva exhibited no abnormalities [Eyelids - No Xanthelasma] : the eyelids demonstrated no xanthelasmas [Normal Oral Mucosa] : normal oral mucosa [No Oral Pallor] : no oral pallor [No Oral Cyanosis] : no oral cyanosis [Respiration, Rhythm And Depth] : normal respiratory rhythm and effort [Exaggerated Use Of Accessory Muscles For Inspiration] : no accessory muscle use [Auscultation Breath Sounds / Voice Sounds] : lungs were clear to auscultation bilaterally [Abdomen Soft] : soft [Abdomen Tenderness] : non-tender [Abdomen Mass (___ Cm)] : no abdominal mass palpated [Abnormal Walk] : normal gait [Gait - Sufficient For Exercise Testing] : the gait was sufficient for exercise testing [Nail Clubbing] : no clubbing of the fingernails [Cyanosis, Localized] : no localized cyanosis [Petechial Hemorrhages (___cm)] : no petechial hemorrhages [Skin Color & Pigmentation] : normal skin color and pigmentation [] : no rash [No Venous Stasis] : no venous stasis [Skin Lesions] : no skin lesions [No Skin Ulcers] : no skin ulcer [No Xanthoma] : no  xanthoma was observed [Oriented To Time, Place, And Person] : oriented to person, place, and time [Affect] : the affect was normal [Mood] : the mood was normal [No Anxiety] : not feeling anxious [Normal Rate] : normal [Rhythm Regular] : regular [1+] : left 1+ [FreeTextEntry1] : No JVD  [de-identified] : well-appearing male, NAD [de-identified] : right neck incisions clean and dry, no evidence of cellulitis or fluid collection, slight dog ear deformity, left neck shave site c/d/i [de-identified] : right axilla, left neck, right groin and left lateral canthal shave sites healing appropriately, c/d/i, no evidence of cellulitis  [Rt] : no varicose veins of the right leg [Lt] : no varicose veins of the left leg

## 2023-06-08 ENCOUNTER — APPOINTMENT (OUTPATIENT)
Dept: CARDIOLOGY | Facility: CLINIC | Age: 63
End: 2023-06-08
Payer: COMMERCIAL

## 2023-06-08 PROCEDURE — 93970 EXTREMITY STUDY: CPT

## 2023-06-21 ENCOUNTER — APPOINTMENT (OUTPATIENT)
Dept: ORTHOPEDIC SURGERY | Facility: CLINIC | Age: 63
End: 2023-06-21
Payer: COMMERCIAL

## 2023-06-21 DIAGNOSIS — M94.20 CHONDROMALACIA, UNSPECIFIED SITE: ICD-10-CM

## 2023-06-21 DIAGNOSIS — S83.241A OTHER TEAR OF MEDIAL MENISCUS, CURRENT INJURY, RIGHT KNEE, INITIAL ENCOUNTER: ICD-10-CM

## 2023-06-21 PROCEDURE — 99203 OFFICE O/P NEW LOW 30 MIN: CPT | Mod: 25

## 2023-06-21 PROCEDURE — 73560 X-RAY EXAM OF KNEE 1 OR 2: CPT | Mod: 50

## 2023-06-21 NOTE — HISTORY OF PRESENT ILLNESS
[de-identified] : cc bl knee\par \par 62 year old male presents bl knees. He works for the [employer] as a manager. He states that he was painting the house a couple of months ago and states he felt pain in both knee. He states he hears clicking. He does say he is getting better, he wears compression socks to help the pain. Was advised by his cardiologist he needs to lose weight. He has not done any home exercises. 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

## 2023-07-05 ENCOUNTER — APPOINTMENT (OUTPATIENT)
Dept: MRI IMAGING | Facility: CLINIC | Age: 63
End: 2023-07-05

## 2023-07-05 ENCOUNTER — APPOINTMENT (OUTPATIENT)
Dept: MRI IMAGING | Facility: CLINIC | Age: 63
End: 2023-07-05
Payer: COMMERCIAL

## 2023-07-05 PROCEDURE — 73721 MRI JNT OF LWR EXTRE W/O DYE: CPT | Mod: RT

## 2023-07-18 RX ORDER — RANOLAZINE 500 MG/1
500 TABLET, EXTENDED RELEASE ORAL
Qty: 180 | Refills: 3 | Status: ACTIVE | COMMUNITY
Start: 2017-06-08 | End: 1900-01-01

## 2023-08-02 ENCOUNTER — APPOINTMENT (OUTPATIENT)
Dept: ORTHOPEDIC SURGERY | Facility: CLINIC | Age: 63
End: 2023-08-02
Payer: COMMERCIAL

## 2023-08-02 DIAGNOSIS — S83.231D COMPLEX TEAR OF MEDIAL MENISCUS, CURRENT INJURY, RIGHT KNEE, SUBSEQUENT ENCOUNTER: ICD-10-CM

## 2023-08-02 PROCEDURE — 99213 OFFICE O/P EST LOW 20 MIN: CPT

## 2023-08-02 NOTE — HISTORY OF PRESENT ILLNESS
[de-identified] : cc rt knee   62-year-old male presents rt knee. He states he is feeling better. He has been walking 3 miles and walking his dog as well.   MRI shows arthritis and meniscal tear.   on exam nontender over medial lateral joint lines good range of motion negative Homans' sign small knee effusion  recommending AAOS sheet  Recommended a better nutrition program by eating until satisfaction, choosing healthier alternatives, and proper portion control.  Follow-up as needed currently asymptomatic with his medial lateral meniscal tears confirmed on MRI

## 2023-10-23 RX ORDER — DEXLANSOPRAZOLE 60 MG/1
60 CAPSULE, DELAYED RELEASE ORAL
Qty: 90 | Refills: 2 | Status: ACTIVE | COMMUNITY
Start: 2018-01-27 | End: 1900-01-01

## 2023-10-31 ENCOUNTER — LABORATORY RESULT (OUTPATIENT)
Age: 63
End: 2023-10-31

## 2023-11-05 LAB
ALBUMIN SERPL ELPH-MCNC: 4.3 G/DL
ALDOLASE SERPL-CCNC: 5.1 U/L
ALP BLD-CCNC: 56 U/L
ALT SERPL-CCNC: 24 U/L
ANION GAP SERPL CALC-SCNC: 11 MMOL/L
AST SERPL-CCNC: 26 U/L
BILIRUB SERPL-MCNC: 0.7 MG/DL
BUN SERPL-MCNC: 16 MG/DL
CALCIUM SERPL-MCNC: 9.1 MG/DL
CHLORIDE SERPL-SCNC: 105 MMOL/L
CHOLEST SERPL-MCNC: 127 MG/DL
CO2 SERPL-SCNC: 27 MMOL/L
CREAT SERPL-MCNC: 1.2 MG/DL
EGFR: 68 ML/MIN/1.73M2
ESTIMATED AVERAGE GLUCOSE: 111 MG/DL
GLUCOSE SERPL-MCNC: 103 MG/DL
HBA1C MFR BLD HPLC: 5.5 %
HDLC SERPL-MCNC: 43 MG/DL
LDLC SERPL CALC-MCNC: 70 MG/DL
NONHDLC SERPL-MCNC: 84 MG/DL
POTASSIUM SERPL-SCNC: 4.4 MMOL/L
PROT SERPL-MCNC: 6.5 G/DL
SODIUM SERPL-SCNC: 143 MMOL/L
TRIGL SERPL-MCNC: 69 MG/DL

## 2023-11-06 ENCOUNTER — APPOINTMENT (OUTPATIENT)
Dept: CARDIOLOGY | Facility: CLINIC | Age: 63
End: 2023-11-06
Payer: COMMERCIAL

## 2023-11-06 VITALS
DIASTOLIC BLOOD PRESSURE: 80 MMHG | SYSTOLIC BLOOD PRESSURE: 122 MMHG | HEART RATE: 60 BPM | HEIGHT: 71 IN | WEIGHT: 250 LBS | BODY MASS INDEX: 35 KG/M2

## 2023-11-06 PROCEDURE — 99214 OFFICE O/P EST MOD 30 MIN: CPT | Mod: 25

## 2023-11-06 PROCEDURE — 93000 ELECTROCARDIOGRAM COMPLETE: CPT

## 2023-11-06 RX ORDER — SILDENAFIL 50 MG/1
50 TABLET ORAL
Qty: 15 | Refills: 3 | Status: ACTIVE | COMMUNITY
Start: 2021-05-06 | End: 1900-01-01

## 2024-01-02 RX ORDER — AMLODIPINE BESYLATE 5 MG/1
5 TABLET ORAL DAILY
Qty: 90 | Refills: 2 | Status: ACTIVE | COMMUNITY
Start: 2020-12-21 | End: 1900-01-01

## 2024-05-05 LAB
ALBUMIN SERPL ELPH-MCNC: 4.3 G/DL
ALP BLD-CCNC: 56 U/L
ALT SERPL-CCNC: 21 U/L
ANION GAP SERPL CALC-SCNC: 14 MMOL/L
AST SERPL-CCNC: 25 U/L
BASOPHILS # BLD AUTO: 0.01 K/UL
BASOPHILS NFR BLD AUTO: 0.2 %
BILIRUB SERPL-MCNC: 0.8 MG/DL
BUN SERPL-MCNC: 17 MG/DL
CALCIUM SERPL-MCNC: 8.8 MG/DL
CHLORIDE SERPL-SCNC: 107 MMOL/L
CHOLEST SERPL-MCNC: 133 MG/DL
CK SERPL-CCNC: 150 U/L
CO2 SERPL-SCNC: 23 MMOL/L
CREAT SERPL-MCNC: 1.1 MG/DL
EGFR: 75 ML/MIN/1.73M2
EOSINOPHIL # BLD AUTO: 0.13 K/UL
EOSINOPHIL NFR BLD AUTO: 2.4 %
ESTIMATED AVERAGE GLUCOSE: 117 MG/DL
GLUCOSE SERPL-MCNC: 102 MG/DL
HBA1C MFR BLD HPLC: 5.7 %
HCT VFR BLD CALC: 43.9 %
HDLC SERPL-MCNC: 40 MG/DL
HGB BLD-MCNC: 14.3 G/DL
IMM GRANULOCYTES NFR BLD AUTO: 0.2 %
LDLC SERPL CALC-MCNC: 77 MG/DL
LYMPHOCYTES # BLD AUTO: 1.89 K/UL
LYMPHOCYTES NFR BLD AUTO: 34.3 %
MAN DIFF?: NORMAL
MCHC RBC-ENTMCNC: 30 PG
MCHC RBC-ENTMCNC: 32.6 G/DL
MCV RBC AUTO: 92 FL
MONOCYTES # BLD AUTO: 0.47 K/UL
MONOCYTES NFR BLD AUTO: 8.5 %
NEUTROPHILS # BLD AUTO: 3 K/UL
NEUTROPHILS NFR BLD AUTO: 54.4 %
NONHDLC SERPL-MCNC: 93 MG/DL
PLATELET # BLD AUTO: 155 K/UL
PMV BLD AUTO: 0 /100 WBCS
POTASSIUM SERPL-SCNC: 3.9 MMOL/L
PROT SERPL-MCNC: 6.5 G/DL
RBC # BLD: 4.77 M/UL
RBC # FLD: 14 %
SODIUM SERPL-SCNC: 144 MMOL/L
TRIGL SERPL-MCNC: 79 MG/DL
WBC # FLD AUTO: 5.51 K/UL

## 2024-05-06 ENCOUNTER — APPOINTMENT (OUTPATIENT)
Dept: CARDIOLOGY | Facility: CLINIC | Age: 64
End: 2024-05-06
Payer: COMMERCIAL

## 2024-05-06 VITALS — BODY MASS INDEX: 35.84 KG/M2 | HEIGHT: 71 IN | WEIGHT: 256 LBS

## 2024-05-06 VITALS — DIASTOLIC BLOOD PRESSURE: 70 MMHG | HEART RATE: 65 BPM | SYSTOLIC BLOOD PRESSURE: 136 MMHG

## 2024-05-06 DIAGNOSIS — E78.5 HYPERLIPIDEMIA, UNSPECIFIED: ICD-10-CM

## 2024-05-06 DIAGNOSIS — R73.03 PREDIABETES.: ICD-10-CM

## 2024-05-06 DIAGNOSIS — I25.10 ATHEROSCLEROTIC HEART DISEASE OF NATIVE CORONARY ARTERY W/OUT ANGINA PECTORIS: ICD-10-CM

## 2024-05-06 DIAGNOSIS — I10 ESSENTIAL (PRIMARY) HYPERTENSION: ICD-10-CM

## 2024-05-06 PROCEDURE — 99214 OFFICE O/P EST MOD 30 MIN: CPT | Mod: 25

## 2024-05-06 PROCEDURE — 93000 ELECTROCARDIOGRAM COMPLETE: CPT

## 2024-05-06 NOTE — HISTORY OF PRESENT ILLNESS
[FreeTextEntry1] : The patient has 3 vessel CAD with stents in RCA and LCX . LAD has distal disease in a small segment of the vessel and is not amendable to intervention . He has been stable with this and has not had obvious angina . The patient has known GERD and his symptoms ni the past were hard to differentiate  He has not had chest pain  Has dyspepsia and gas which at times to go up into chest chest and he would respond wiht a belch . It is accosiated with certain foods or eating late at night He is walking an dis not getting angian .

## 2024-05-06 NOTE — ASSESSMENT
[FreeTextEntry1] : The patient has had no chest pain or SOB Has had GERD like symptoms . LDL is close to goal. He has stents in his LAD LCX and the LAD is not amenable to intervention ( distal vessel) .  He has not had angina  He does have GERD like symptoms and is seeing his gastroenterologist soon .

## 2024-05-06 NOTE — CARDIOLOGY SUMMARY
[de-identified] : NSR Normal ECG 5-7-2021  3- NSR Normal ECG .  11- NSR Normal ECG  11-6-2023 NSR Normal ECG  5- NSR Normal ECG . . 5-6-2024 NSR Normal ECG .   [de-identified] : [id number] ETT echo as negative for ischemia on echo images at high exercise load . Completed stage III Mil AI Mild MR and TR  [___] : [unfilled]

## 2024-05-06 NOTE — REASON FOR VISIT
[FreeTextEntry3] : Dr. Delcid  [Follow-Up - Clinic] : a clinic follow-up of [Coronary Artery Disease] : coronary artery disease [Hyperlipidemia] : hyperlipidemia [Hypertension] : hypertension [Post Op: _________] : a [unfilled] post op visit

## 2024-05-06 NOTE — PHYSICAL EXAM
[General Appearance - Well Developed] : well developed [Normal Appearance] : normal appearance [Well Groomed] : well groomed [General Appearance - Well Nourished] : well nourished [No Deformities] : no deformities [General Appearance - In No Acute Distress] : no acute distress [Normal Conjunctiva] : the conjunctiva exhibited no abnormalities [Eyelids - No Xanthelasma] : the eyelids demonstrated no xanthelasmas [Normal Oral Mucosa] : normal oral mucosa [No Oral Pallor] : no oral pallor [No Oral Cyanosis] : no oral cyanosis [FreeTextEntry1] : No JVD  [Respiration, Rhythm And Depth] : normal respiratory rhythm and effort [Exaggerated Use Of Accessory Muscles For Inspiration] : no accessory muscle use [Auscultation Breath Sounds / Voice Sounds] : lungs were clear to auscultation bilaterally [Abdomen Soft] : soft [Abdomen Tenderness] : non-tender [Abdomen Mass (___ Cm)] : no abdominal mass palpated [Abnormal Walk] : normal gait [Gait - Sufficient For Exercise Testing] : the gait was sufficient for exercise testing [Nail Clubbing] : no clubbing of the fingernails [Cyanosis, Localized] : no localized cyanosis [Petechial Hemorrhages (___cm)] : no petechial hemorrhages [Skin Color & Pigmentation] : normal skin color and pigmentation [] : no rash [No Venous Stasis] : no venous stasis [Skin Lesions] : no skin lesions [No Skin Ulcers] : no skin ulcer [No Xanthoma] : no  xanthoma was observed [Oriented To Time, Place, And Person] : oriented to person, place, and time [Affect] : the affect was normal [Mood] : the mood was normal [No Anxiety] : not feeling anxious [de-identified] : well-appearing male, NAD [de-identified] : right neck incisions clean and dry, no evidence of cellulitis or fluid collection, slight dog ear deformity, left neck shave site c/d/i [de-identified] : right axilla, left neck, right groin and left lateral canthal shave sites healing appropriately, c/d/i, no evidence of cellulitis  [Normal Rate] : normal [Rhythm Regular] : regular [1+] : left 1+ [Rt] : no varicose veins of the right leg [Lt] : no varicose veins of the left leg

## 2024-06-06 RX ORDER — LISINOPRIL 10 MG/1
10 TABLET ORAL DAILY
Qty: 90 | Refills: 2 | Status: ACTIVE | COMMUNITY
Start: 2018-05-27 | End: 1900-01-01

## 2024-10-09 ENCOUNTER — APPOINTMENT (OUTPATIENT)
Dept: CARDIOLOGY | Facility: CLINIC | Age: 64
End: 2024-10-09
Payer: COMMERCIAL

## 2024-10-09 DIAGNOSIS — E78.5 HYPERLIPIDEMIA, UNSPECIFIED: ICD-10-CM

## 2024-10-09 DIAGNOSIS — I25.10 ATHEROSCLEROTIC HEART DISEASE OF NATIVE CORONARY ARTERY W/OUT ANGINA PECTORIS: ICD-10-CM

## 2024-10-09 DIAGNOSIS — I10 ESSENTIAL (PRIMARY) HYPERTENSION: ICD-10-CM

## 2024-10-09 PROCEDURE — 93325 DOPPLER ECHO COLOR FLOW MAPG: CPT

## 2024-10-09 PROCEDURE — 93351 STRESS TTE COMPLETE: CPT

## 2024-10-09 PROCEDURE — 93320 DOPPLER ECHO COMPLETE: CPT

## 2024-11-04 ENCOUNTER — LABORATORY RESULT (OUTPATIENT)
Age: 64
End: 2024-11-04

## 2024-11-11 ENCOUNTER — APPOINTMENT (OUTPATIENT)
Dept: CARDIOLOGY | Facility: CLINIC | Age: 64
End: 2024-11-11
Payer: COMMERCIAL

## 2024-11-11 VITALS — HEART RATE: 61 BPM

## 2024-11-11 VITALS — TEMPERATURE: 97.6 F | HEIGHT: 71 IN

## 2024-11-11 VITALS — SYSTOLIC BLOOD PRESSURE: 124 MMHG | WEIGHT: 243 LBS | BODY MASS INDEX: 33.89 KG/M2 | DIASTOLIC BLOOD PRESSURE: 78 MMHG

## 2024-11-11 DIAGNOSIS — E78.5 HYPERLIPIDEMIA, UNSPECIFIED: ICD-10-CM

## 2024-11-11 DIAGNOSIS — I25.10 ATHEROSCLEROTIC HEART DISEASE OF NATIVE CORONARY ARTERY W/OUT ANGINA PECTORIS: ICD-10-CM

## 2024-11-11 PROCEDURE — 93000 ELECTROCARDIOGRAM COMPLETE: CPT

## 2024-11-11 PROCEDURE — 99214 OFFICE O/P EST MOD 30 MIN: CPT | Mod: 25

## 2024-11-11 RX ORDER — PANTOPRAZOLE 20 MG/1
20 TABLET, DELAYED RELEASE ORAL
Refills: 0 | Status: ACTIVE | COMMUNITY

## 2025-05-10 ENCOUNTER — NON-APPOINTMENT (OUTPATIENT)
Age: 65
End: 2025-05-10

## 2025-05-12 ENCOUNTER — APPOINTMENT (OUTPATIENT)
Dept: CARDIOLOGY | Facility: CLINIC | Age: 65
End: 2025-05-12
Payer: COMMERCIAL

## 2025-05-12 VITALS — HEIGHT: 71 IN | WEIGHT: 244 LBS | BODY MASS INDEX: 34.16 KG/M2 | TEMPERATURE: 97.6 F

## 2025-05-12 VITALS — SYSTOLIC BLOOD PRESSURE: 124 MMHG | DIASTOLIC BLOOD PRESSURE: 80 MMHG

## 2025-05-12 DIAGNOSIS — R73.03 PREDIABETES.: ICD-10-CM

## 2025-05-12 DIAGNOSIS — E78.5 HYPERLIPIDEMIA, UNSPECIFIED: ICD-10-CM

## 2025-05-12 DIAGNOSIS — I10 ESSENTIAL (PRIMARY) HYPERTENSION: ICD-10-CM

## 2025-05-12 PROCEDURE — 99214 OFFICE O/P EST MOD 30 MIN: CPT | Mod: 25

## 2025-05-12 PROCEDURE — 93000 ELECTROCARDIOGRAM COMPLETE: CPT
